# Patient Record
Sex: FEMALE | NOT HISPANIC OR LATINO | Employment: FULL TIME | URBAN - METROPOLITAN AREA
[De-identification: names, ages, dates, MRNs, and addresses within clinical notes are randomized per-mention and may not be internally consistent; named-entity substitution may affect disease eponyms.]

---

## 2022-01-12 ENCOUNTER — HOSPITAL ENCOUNTER (EMERGENCY)
Facility: HOSPITAL | Age: 37
Discharge: LEFT BEFORE DISCHARGE | End: 2022-01-12

## 2022-01-12 VITALS
BODY MASS INDEX: 19.99 KG/M2 | HEART RATE: 120 BPM | SYSTOLIC BLOOD PRESSURE: 141 MMHG | WEIGHT: 120 LBS | RESPIRATION RATE: 16 BRPM | HEIGHT: 65 IN | OXYGEN SATURATION: 100 % | DIASTOLIC BLOOD PRESSURE: 92 MMHG | TEMPERATURE: 98 F

## 2022-01-12 LAB
BASOPHILS # BLD AUTO: 0.04 10*3/MM3 (ref 0–0.2)
BASOPHILS NFR BLD AUTO: 0.3 % (ref 0–1.5)
DEPRECATED RDW RBC AUTO: 43.6 FL (ref 37–54)
EOSINOPHIL # BLD AUTO: 0.15 10*3/MM3 (ref 0–0.4)
EOSINOPHIL NFR BLD AUTO: 1.2 % (ref 0.3–6.2)
ERYTHROCYTE [DISTWIDTH] IN BLOOD BY AUTOMATED COUNT: 14.4 % (ref 12.3–15.4)
HCT VFR BLD AUTO: 35.2 % (ref 34–46.6)
HGB BLD-MCNC: 11.6 G/DL (ref 12–15.9)
HOLD SPECIMEN: NORMAL
HOLD SPECIMEN: NORMAL
IMM GRANULOCYTES # BLD AUTO: 0.05 10*3/MM3 (ref 0–0.05)
IMM GRANULOCYTES NFR BLD AUTO: 0.4 % (ref 0–0.5)
LYMPHOCYTES # BLD AUTO: 2.49 10*3/MM3 (ref 0.7–3.1)
LYMPHOCYTES NFR BLD AUTO: 19.3 % (ref 19.6–45.3)
MCH RBC QN AUTO: 27.5 PG (ref 26.6–33)
MCHC RBC AUTO-ENTMCNC: 33 G/DL (ref 31.5–35.7)
MCV RBC AUTO: 83.4 FL (ref 79–97)
MONOCYTES # BLD AUTO: 0.63 10*3/MM3 (ref 0.1–0.9)
MONOCYTES NFR BLD AUTO: 4.9 % (ref 5–12)
NEUTROPHILS NFR BLD AUTO: 73.9 % (ref 42.7–76)
NEUTROPHILS NFR BLD AUTO: 9.55 10*3/MM3 (ref 1.7–7)
NRBC BLD AUTO-RTO: 0 /100 WBC (ref 0–0.2)
PLATELET # BLD AUTO: 214 10*3/MM3 (ref 140–450)
PMV BLD AUTO: 11.1 FL (ref 6–12)
RBC # BLD AUTO: 4.22 10*6/MM3 (ref 3.77–5.28)
WBC NRBC COR # BLD: 12.91 10*3/MM3 (ref 3.4–10.8)
WHOLE BLOOD HOLD SPECIMEN: NORMAL
WHOLE BLOOD HOLD SPECIMEN: NORMAL

## 2022-01-12 PROCEDURE — 93005 ELECTROCARDIOGRAM TRACING: CPT

## 2022-01-12 PROCEDURE — 99211 OFF/OP EST MAY X REQ PHY/QHP: CPT

## 2022-01-12 PROCEDURE — 85025 COMPLETE CBC W/AUTO DIFF WBC: CPT

## 2022-01-12 RX ORDER — SODIUM CHLORIDE 0.9 % (FLUSH) 0.9 %
10 SYRINGE (ML) INJECTION AS NEEDED
Status: DISCONTINUED | OUTPATIENT
Start: 2022-01-12 | End: 2022-01-13 | Stop reason: HOSPADM

## 2022-01-13 ENCOUNTER — ANESTHESIA EVENT (OUTPATIENT)
Dept: PERIOP | Facility: HOSPITAL | Age: 37
End: 2022-01-13

## 2022-01-13 ENCOUNTER — ANESTHESIA (OUTPATIENT)
Dept: PERIOP | Facility: HOSPITAL | Age: 37
End: 2022-01-13

## 2022-01-13 ENCOUNTER — APPOINTMENT (OUTPATIENT)
Dept: ULTRASOUND IMAGING | Facility: HOSPITAL | Age: 37
End: 2022-01-13

## 2022-01-13 ENCOUNTER — HOSPITAL ENCOUNTER (OUTPATIENT)
Facility: HOSPITAL | Age: 37
Discharge: HOME | End: 2022-01-14
Attending: EMERGENCY MEDICINE | Admitting: OBSTETRICS & GYNECOLOGY

## 2022-01-13 DIAGNOSIS — O00.102 RUPTURED LEFT TUBAL ECTOPIC PREGNANCY CAUSING HEMOPERITONEUM: Primary | ICD-10-CM

## 2022-01-13 DIAGNOSIS — Z92.0 HISTORY OF USE OF CONTRACEPTIVE INTRAUTERINE DEVICE (IUD): ICD-10-CM

## 2022-01-13 DIAGNOSIS — R10.2 SUPRAPUBIC ABDOMINAL PAIN: ICD-10-CM

## 2022-01-13 DIAGNOSIS — K66.1 RUPTURED LEFT TUBAL ECTOPIC PREGNANCY CAUSING HEMOPERITONEUM: Primary | ICD-10-CM

## 2022-01-13 DIAGNOSIS — N93.9 VAGINAL BLEEDING: ICD-10-CM

## 2022-01-13 DIAGNOSIS — O00.90 ECTOPIC PREGNANCY: ICD-10-CM

## 2022-01-13 PROBLEM — D50.0 BLOOD LOSS ANEMIA: Status: ACTIVE | Noted: 2022-01-13

## 2022-01-13 LAB
ABO GROUP BLD: NORMAL
ABO GROUP BLD: NORMAL
ALBUMIN SERPL-MCNC: 4.5 G/DL (ref 3.5–5.2)
ALBUMIN/GLOB SERPL: 1.7 G/DL
ALP SERPL-CCNC: 77 U/L (ref 39–117)
ALT SERPL W P-5'-P-CCNC: 16 U/L (ref 1–33)
ANION GAP SERPL CALCULATED.3IONS-SCNC: 11 MMOL/L (ref 5–15)
AST SERPL-CCNC: 16 U/L (ref 1–32)
BACTERIA UR QL AUTO: ABNORMAL /HPF
BASOPHILS # BLD AUTO: 0.03 10*3/MM3 (ref 0–0.2)
BASOPHILS NFR BLD AUTO: 0.4 % (ref 0–1.5)
BILIRUB SERPL-MCNC: 0.7 MG/DL (ref 0–1.2)
BILIRUB UR QL STRIP: NEGATIVE
BLD GP AB SCN SERPL QL: NEGATIVE
BUN SERPL-MCNC: 9 MG/DL (ref 6–20)
BUN/CREAT SERPL: 15.5 (ref 7–25)
CALCIUM SPEC-SCNC: 9.3 MG/DL (ref 8.6–10.5)
CHLORIDE SERPL-SCNC: 105 MMOL/L (ref 98–107)
CLARITY UR: ABNORMAL
CO2 SERPL-SCNC: 23 MMOL/L (ref 22–29)
COLOR UR: YELLOW
CREAT SERPL-MCNC: 0.58 MG/DL (ref 0.57–1)
DEPRECATED RDW RBC AUTO: 43.8 FL (ref 37–54)
EOSINOPHIL # BLD AUTO: 0.1 10*3/MM3 (ref 0–0.4)
EOSINOPHIL NFR BLD AUTO: 1.3 % (ref 0.3–6.2)
ERYTHROCYTE [DISTWIDTH] IN BLOOD BY AUTOMATED COUNT: 14.2 % (ref 12.3–15.4)
GFR SERPL CREATININE-BSD FRML MDRD: 116 ML/MIN/1.73
GFR SERPL CREATININE-BSD FRML MDRD: 141 ML/MIN/1.73
GLOBULIN UR ELPH-MCNC: 2.7 GM/DL
GLUCOSE SERPL-MCNC: 92 MG/DL (ref 65–99)
GLUCOSE UR STRIP-MCNC: NEGATIVE MG/DL
HCG INTACT+B SERPL-ACNC: 2672 MIU/ML
HCT VFR BLD AUTO: 31.2 % (ref 34–46.6)
HGB BLD-MCNC: 10.2 G/DL (ref 12–15.9)
HGB UR QL STRIP.AUTO: ABNORMAL
HOLD SPECIMEN: NORMAL
HYALINE CASTS UR QL AUTO: ABNORMAL /LPF
IMM GRANULOCYTES # BLD AUTO: 0.01 10*3/MM3 (ref 0–0.05)
IMM GRANULOCYTES NFR BLD AUTO: 0.1 % (ref 0–0.5)
KETONES UR QL STRIP: ABNORMAL
LEUKOCYTE ESTERASE UR QL STRIP.AUTO: NEGATIVE
LIPASE SERPL-CCNC: 22 U/L (ref 13–60)
LYMPHOCYTES # BLD AUTO: 1.7 10*3/MM3 (ref 0.7–3.1)
LYMPHOCYTES NFR BLD AUTO: 22.1 % (ref 19.6–45.3)
MCH RBC QN AUTO: 27.5 PG (ref 26.6–33)
MCHC RBC AUTO-ENTMCNC: 32.7 G/DL (ref 31.5–35.7)
MCV RBC AUTO: 84.1 FL (ref 79–97)
MONOCYTES # BLD AUTO: 0.51 10*3/MM3 (ref 0.1–0.9)
MONOCYTES NFR BLD AUTO: 6.6 % (ref 5–12)
NEUTROPHILS NFR BLD AUTO: 5.35 10*3/MM3 (ref 1.7–7)
NEUTROPHILS NFR BLD AUTO: 69.5 % (ref 42.7–76)
NITRITE UR QL STRIP: NEGATIVE
NRBC BLD AUTO-RTO: 0 /100 WBC (ref 0–0.2)
PH UR STRIP.AUTO: 5.5 [PH] (ref 5–8)
PLATELET # BLD AUTO: 210 10*3/MM3 (ref 140–450)
PMV BLD AUTO: 11.1 FL (ref 6–12)
POTASSIUM SERPL-SCNC: 4.1 MMOL/L (ref 3.5–5.2)
PROT SERPL-MCNC: 7.2 G/DL (ref 6–8.5)
PROT UR QL STRIP: NEGATIVE
RBC # BLD AUTO: 3.71 10*6/MM3 (ref 3.77–5.28)
RBC # UR STRIP: ABNORMAL /HPF
REF LAB TEST METHOD: ABNORMAL
RH BLD: POSITIVE
RH BLD: POSITIVE
SARS-COV-2 RDRP RESP QL NAA+PROBE: NORMAL
SODIUM SERPL-SCNC: 139 MMOL/L (ref 136–145)
SP GR UR STRIP: 1.01 (ref 1–1.03)
SQUAMOUS #/AREA URNS HPF: ABNORMAL /HPF
T&S EXPIRATION DATE: NORMAL
UROBILINOGEN UR QL STRIP: ABNORMAL
WBC # UR STRIP: ABNORMAL /HPF
WBC NRBC COR # BLD: 7.7 10*3/MM3 (ref 3.4–10.8)
WHOLE BLOOD HOLD SPECIMEN: NORMAL
WHOLE BLOOD HOLD SPECIMEN: NORMAL

## 2022-01-13 PROCEDURE — 88305 TISSUE EXAM BY PATHOLOGIST: CPT | Performed by: OBSTETRICS & GYNECOLOGY

## 2022-01-13 PROCEDURE — 84702 CHORIONIC GONADOTROPIN TEST: CPT

## 2022-01-13 PROCEDURE — 86850 RBC ANTIBODY SCREEN: CPT | Performed by: OBSTETRICS & GYNECOLOGY

## 2022-01-13 PROCEDURE — 86900 BLOOD TYPING SEROLOGIC ABO: CPT | Performed by: PHYSICIAN ASSISTANT

## 2022-01-13 PROCEDURE — 86901 BLOOD TYPING SEROLOGIC RH(D): CPT | Performed by: OBSTETRICS & GYNECOLOGY

## 2022-01-13 PROCEDURE — 25010000002 NEOSTIGMINE 10 MG/10ML SOLUTION: Performed by: ANESTHESIOLOGY

## 2022-01-13 PROCEDURE — 25010000002 DEXAMETHASONE PER 1 MG: Performed by: ANESTHESIOLOGY

## 2022-01-13 PROCEDURE — 81001 URINALYSIS AUTO W/SCOPE: CPT

## 2022-01-13 PROCEDURE — 86900 BLOOD TYPING SEROLOGIC ABO: CPT | Performed by: OBSTETRICS & GYNECOLOGY

## 2022-01-13 PROCEDURE — 76817 TRANSVAGINAL US OBSTETRIC: CPT

## 2022-01-13 PROCEDURE — 80053 COMPREHEN METABOLIC PANEL: CPT

## 2022-01-13 PROCEDURE — 87635 SARS-COV-2 COVID-19 AMP PRB: CPT | Performed by: PHYSICIAN ASSISTANT

## 2022-01-13 PROCEDURE — 99284 EMERGENCY DEPT VISIT MOD MDM: CPT

## 2022-01-13 PROCEDURE — 86901 BLOOD TYPING SEROLOGIC RH(D): CPT | Performed by: PHYSICIAN ASSISTANT

## 2022-01-13 PROCEDURE — 25010000002 FENTANYL CITRATE (PF) 50 MCG/ML SOLUTION: Performed by: ANESTHESIOLOGY

## 2022-01-13 PROCEDURE — 83690 ASSAY OF LIPASE: CPT

## 2022-01-13 PROCEDURE — C9803 HOPD COVID-19 SPEC COLLECT: HCPCS

## 2022-01-13 PROCEDURE — 85025 COMPLETE CBC W/AUTO DIFF WBC: CPT

## 2022-01-13 PROCEDURE — 25010000002 PROPOFOL 10 MG/ML EMULSION: Performed by: ANESTHESIOLOGY

## 2022-01-13 PROCEDURE — 25010000002 ONDANSETRON PER 1 MG: Performed by: ANESTHESIOLOGY

## 2022-01-13 RX ORDER — ONDANSETRON 2 MG/ML
INJECTION INTRAMUSCULAR; INTRAVENOUS AS NEEDED
Status: DISCONTINUED | OUTPATIENT
Start: 2022-01-13 | End: 2022-01-13 | Stop reason: SURG

## 2022-01-13 RX ORDER — GLYCOPYRROLATE 0.2 MG/ML
INJECTION INTRAMUSCULAR; INTRAVENOUS AS NEEDED
Status: DISCONTINUED | OUTPATIENT
Start: 2022-01-13 | End: 2022-01-13 | Stop reason: SURG

## 2022-01-13 RX ORDER — BUPIVACAINE HYDROCHLORIDE AND EPINEPHRINE 5; 5 MG/ML; UG/ML
INJECTION, SOLUTION PERINEURAL AS NEEDED
Status: DISCONTINUED | OUTPATIENT
Start: 2022-01-13 | End: 2022-01-13 | Stop reason: HOSPADM

## 2022-01-13 RX ORDER — LIDOCAINE HYDROCHLORIDE 10 MG/ML
0.5 INJECTION, SOLUTION EPIDURAL; INFILTRATION; INTRACAUDAL; PERINEURAL ONCE AS NEEDED
Status: DISCONTINUED | OUTPATIENT
Start: 2022-01-13 | End: 2022-01-14

## 2022-01-13 RX ORDER — SODIUM CHLORIDE 0.9 % (FLUSH) 0.9 %
10 SYRINGE (ML) INJECTION EVERY 12 HOURS SCHEDULED
Status: DISCONTINUED | OUTPATIENT
Start: 2022-01-13 | End: 2022-01-14

## 2022-01-13 RX ORDER — SODIUM CHLORIDE 9 MG/ML
10 INJECTION INTRAVENOUS AS NEEDED
Status: DISCONTINUED | OUTPATIENT
Start: 2022-01-13 | End: 2022-01-14

## 2022-01-13 RX ORDER — LIDOCAINE HYDROCHLORIDE 20 MG/ML
INJECTION, SOLUTION INFILTRATION; PERINEURAL AS NEEDED
Status: DISCONTINUED | OUTPATIENT
Start: 2022-01-13 | End: 2022-01-13 | Stop reason: SURG

## 2022-01-13 RX ORDER — MEPERIDINE HYDROCHLORIDE 25 MG/ML
12.5 INJECTION INTRAMUSCULAR; INTRAVENOUS; SUBCUTANEOUS
Status: DISCONTINUED | OUTPATIENT
Start: 2022-01-13 | End: 2022-01-14

## 2022-01-13 RX ORDER — SODIUM CHLORIDE, SODIUM LACTATE, POTASSIUM CHLORIDE, CALCIUM CHLORIDE 600; 310; 30; 20 MG/100ML; MG/100ML; MG/100ML; MG/100ML
INJECTION, SOLUTION INTRAVENOUS CONTINUOUS PRN
Status: DISCONTINUED | OUTPATIENT
Start: 2022-01-13 | End: 2022-01-13 | Stop reason: SURG

## 2022-01-13 RX ORDER — NEOSTIGMINE METHYLSULFATE 1 MG/ML
INJECTION, SOLUTION INTRAVENOUS AS NEEDED
Status: DISCONTINUED | OUTPATIENT
Start: 2022-01-13 | End: 2022-01-13 | Stop reason: SURG

## 2022-01-13 RX ORDER — DROPERIDOL 2.5 MG/ML
0.62 INJECTION, SOLUTION INTRAMUSCULAR; INTRAVENOUS ONCE AS NEEDED
Status: DISCONTINUED | OUTPATIENT
Start: 2022-01-13 | End: 2022-01-14

## 2022-01-13 RX ORDER — FENTANYL CITRATE 50 UG/ML
INJECTION, SOLUTION INTRAMUSCULAR; INTRAVENOUS AS NEEDED
Status: DISCONTINUED | OUTPATIENT
Start: 2022-01-13 | End: 2022-01-13 | Stop reason: SURG

## 2022-01-13 RX ORDER — DEXAMETHASONE SODIUM PHOSPHATE 10 MG/ML
INJECTION INTRAMUSCULAR; INTRAVENOUS AS NEEDED
Status: DISCONTINUED | OUTPATIENT
Start: 2022-01-13 | End: 2022-01-13 | Stop reason: SURG

## 2022-01-13 RX ORDER — FENTANYL CITRATE 50 UG/ML
50 INJECTION, SOLUTION INTRAMUSCULAR; INTRAVENOUS
Status: DISCONTINUED | OUTPATIENT
Start: 2022-01-13 | End: 2022-01-14

## 2022-01-13 RX ORDER — FAMOTIDINE 10 MG/ML
20 INJECTION, SOLUTION INTRAVENOUS ONCE
Status: COMPLETED | OUTPATIENT
Start: 2022-01-13 | End: 2022-01-13

## 2022-01-13 RX ORDER — ONDANSETRON 2 MG/ML
4 INJECTION INTRAMUSCULAR; INTRAVENOUS ONCE AS NEEDED
Status: DISCONTINUED | OUTPATIENT
Start: 2022-01-13 | End: 2022-01-14

## 2022-01-13 RX ORDER — SODIUM CHLORIDE 0.9 % (FLUSH) 0.9 %
10 SYRINGE (ML) INJECTION AS NEEDED
Status: DISCONTINUED | OUTPATIENT
Start: 2022-01-13 | End: 2022-01-14

## 2022-01-13 RX ORDER — FAMOTIDINE 20 MG/1
20 TABLET, FILM COATED ORAL ONCE
Status: DISCONTINUED | OUTPATIENT
Start: 2022-01-13 | End: 2022-01-14

## 2022-01-13 RX ORDER — PROPOFOL 10 MG/ML
VIAL (ML) INTRAVENOUS AS NEEDED
Status: DISCONTINUED | OUTPATIENT
Start: 2022-01-13 | End: 2022-01-13 | Stop reason: SURG

## 2022-01-13 RX ORDER — MIDAZOLAM HYDROCHLORIDE 1 MG/ML
1 INJECTION INTRAMUSCULAR; INTRAVENOUS
Status: DISCONTINUED | OUTPATIENT
Start: 2022-01-13 | End: 2022-01-14

## 2022-01-13 RX ORDER — SODIUM CHLORIDE 9 MG/ML
INJECTION, SOLUTION INTRAVENOUS AS NEEDED
Status: DISCONTINUED | OUTPATIENT
Start: 2022-01-13 | End: 2022-01-13 | Stop reason: HOSPADM

## 2022-01-13 RX ORDER — HYDROMORPHONE HYDROCHLORIDE 1 MG/ML
0.5 INJECTION, SOLUTION INTRAMUSCULAR; INTRAVENOUS; SUBCUTANEOUS
Status: DISCONTINUED | OUTPATIENT
Start: 2022-01-13 | End: 2022-01-14

## 2022-01-13 RX ORDER — SODIUM CHLORIDE, SODIUM LACTATE, POTASSIUM CHLORIDE, CALCIUM CHLORIDE 600; 310; 30; 20 MG/100ML; MG/100ML; MG/100ML; MG/100ML
9 INJECTION, SOLUTION INTRAVENOUS CONTINUOUS
Status: DISCONTINUED | OUTPATIENT
Start: 2022-01-13 | End: 2022-01-14

## 2022-01-13 RX ORDER — ROCURONIUM BROMIDE 10 MG/ML
INJECTION, SOLUTION INTRAVENOUS AS NEEDED
Status: DISCONTINUED | OUTPATIENT
Start: 2022-01-13 | End: 2022-01-13 | Stop reason: SURG

## 2022-01-13 RX ADMIN — SODIUM CHLORIDE 1000 ML: 9 INJECTION, SOLUTION INTRAVENOUS at 20:58

## 2022-01-13 RX ADMIN — DEXAMETHASONE SODIUM PHOSPHATE 8 MG: 10 INJECTION INTRAMUSCULAR; INTRAVENOUS at 22:23

## 2022-01-13 RX ADMIN — FAMOTIDINE 20 MG: 10 INJECTION INTRAVENOUS at 22:03

## 2022-01-13 RX ADMIN — GLYCOPYRROLATE 0.4 MG: 0.2 INJECTION INTRAMUSCULAR; INTRAVENOUS at 23:31

## 2022-01-13 RX ADMIN — ROCURONIUM BROMIDE 35 MG: 10 INJECTION INTRAVENOUS at 22:14

## 2022-01-13 RX ADMIN — ONDANSETRON 4 MG: 2 INJECTION INTRAMUSCULAR; INTRAVENOUS at 23:31

## 2022-01-13 RX ADMIN — LIDOCAINE HYDROCHLORIDE 50 MG: 20 INJECTION, SOLUTION INFILTRATION; PERINEURAL at 22:13

## 2022-01-13 RX ADMIN — FENTANYL CITRATE 100 MCG: 50 INJECTION, SOLUTION INTRAMUSCULAR; INTRAVENOUS at 22:13

## 2022-01-13 RX ADMIN — PROPOFOL 100 MG: 10 INJECTION, EMULSION INTRAVENOUS at 22:13

## 2022-01-13 RX ADMIN — NEOSTIGMINE METHYLSULFATE 2 MG: 0.5 INJECTION INTRAVENOUS at 23:31

## 2022-01-13 RX ADMIN — SODIUM CHLORIDE, POTASSIUM CHLORIDE, SODIUM LACTATE AND CALCIUM CHLORIDE: 600; 310; 30; 20 INJECTION, SOLUTION INTRAVENOUS at 22:13

## 2022-01-13 RX ADMIN — SODIUM CHLORIDE, POTASSIUM CHLORIDE, SODIUM LACTATE AND CALCIUM CHLORIDE 9 ML/HR: 600; 310; 30; 20 INJECTION, SOLUTION INTRAVENOUS at 22:03

## 2022-01-14 ENCOUNTER — READMISSION MANAGEMENT (OUTPATIENT)
Dept: CALL CENTER | Facility: HOSPITAL | Age: 37
End: 2022-01-14

## 2022-01-14 VITALS
HEART RATE: 75 BPM | SYSTOLIC BLOOD PRESSURE: 106 MMHG | BODY MASS INDEX: 21.79 KG/M2 | WEIGHT: 130.8 LBS | HEIGHT: 65 IN | DIASTOLIC BLOOD PRESSURE: 79 MMHG | OXYGEN SATURATION: 100 % | RESPIRATION RATE: 16 BRPM | TEMPERATURE: 98.5 F

## 2022-01-14 LAB
DEPRECATED RDW RBC AUTO: 43.7 FL (ref 37–54)
ERYTHROCYTE [DISTWIDTH] IN BLOOD BY AUTOMATED COUNT: 14.1 % (ref 12.3–15.4)
HCT VFR BLD AUTO: 31.6 % (ref 34–46.6)
HGB BLD-MCNC: 10.1 G/DL (ref 12–15.9)
MCH RBC QN AUTO: 27.2 PG (ref 26.6–33)
MCHC RBC AUTO-ENTMCNC: 32 G/DL (ref 31.5–35.7)
MCV RBC AUTO: 85.2 FL (ref 79–97)
PLATELET # BLD AUTO: 187 10*3/MM3 (ref 140–450)
PMV BLD AUTO: 11.4 FL (ref 6–12)
POTASSIUM SERPL-SCNC: 3.3 MMOL/L (ref 3.5–5.2)
RBC # BLD AUTO: 3.71 10*6/MM3 (ref 3.77–5.28)
WBC NRBC COR # BLD: 9.74 10*3/MM3 (ref 3.4–10.8)

## 2022-01-14 PROCEDURE — 25010000002 KETOROLAC TROMETHAMINE PER 15 MG: Performed by: OBSTETRICS & GYNECOLOGY

## 2022-01-14 PROCEDURE — 85027 COMPLETE CBC AUTOMATED: CPT | Performed by: OBSTETRICS & GYNECOLOGY

## 2022-01-14 PROCEDURE — 84132 ASSAY OF SERUM POTASSIUM: CPT | Performed by: ANESTHESIOLOGY

## 2022-01-14 PROCEDURE — G0378 HOSPITAL OBSERVATION PER HR: HCPCS

## 2022-01-14 RX ORDER — ONDANSETRON 4 MG/1
4 TABLET, FILM COATED ORAL EVERY 6 HOURS PRN
Status: DISCONTINUED | OUTPATIENT
Start: 2022-01-14 | End: 2022-01-14 | Stop reason: HOSPADM

## 2022-01-14 RX ORDER — IBUPROFEN 600 MG/1
600 TABLET ORAL EVERY 6 HOURS PRN
Qty: 30 TABLET | Refills: 0 | Status: SHIPPED | OUTPATIENT
Start: 2022-01-14 | End: 2022-01-27

## 2022-01-14 RX ORDER — KETOROLAC TROMETHAMINE 15 MG/ML
15 INJECTION, SOLUTION INTRAMUSCULAR; INTRAVENOUS EVERY 6 HOURS
Status: DISCONTINUED | OUTPATIENT
Start: 2022-01-14 | End: 2022-01-14 | Stop reason: SDUPTHER

## 2022-01-14 RX ORDER — SIMETHICONE 80 MG
80 TABLET,CHEWABLE ORAL 4 TIMES DAILY PRN
Status: DISCONTINUED | OUTPATIENT
Start: 2022-01-14 | End: 2022-01-14 | Stop reason: HOSPADM

## 2022-01-14 RX ORDER — HYDROCODONE BITARTRATE AND ACETAMINOPHEN 10; 325 MG/1; MG/1
1 TABLET ORAL EVERY 4 HOURS PRN
Status: DISCONTINUED | OUTPATIENT
Start: 2022-01-14 | End: 2022-01-14 | Stop reason: HOSPADM

## 2022-01-14 RX ORDER — POTASSIUM CHLORIDE 750 MG/1
40 CAPSULE, EXTENDED RELEASE ORAL ONCE
Status: COMPLETED | OUTPATIENT
Start: 2022-01-14 | End: 2022-01-14

## 2022-01-14 RX ORDER — DOCUSATE SODIUM 100 MG/1
100 CAPSULE, LIQUID FILLED ORAL 2 TIMES DAILY
Status: DISCONTINUED | OUTPATIENT
Start: 2022-01-14 | End: 2022-01-14 | Stop reason: HOSPADM

## 2022-01-14 RX ORDER — NALOXONE HCL 0.4 MG/ML
0.1 VIAL (ML) INJECTION
Status: DISCONTINUED | OUTPATIENT
Start: 2022-01-14 | End: 2022-01-14 | Stop reason: HOSPADM

## 2022-01-14 RX ORDER — HYDROCODONE BITARTRATE AND ACETAMINOPHEN 5; 325 MG/1; MG/1
1 TABLET ORAL EVERY 4 HOURS PRN
Qty: 10 TABLET | Refills: 0 | Status: SHIPPED | OUTPATIENT
Start: 2022-01-14 | End: 2022-01-24

## 2022-01-14 RX ORDER — METOCLOPRAMIDE HYDROCHLORIDE 5 MG/ML
10 INJECTION INTRAMUSCULAR; INTRAVENOUS EVERY 6 HOURS PRN
Status: DISCONTINUED | OUTPATIENT
Start: 2022-01-14 | End: 2022-01-14 | Stop reason: HOSPADM

## 2022-01-14 RX ORDER — SODIUM CHLORIDE, SODIUM LACTATE, POTASSIUM CHLORIDE, CALCIUM CHLORIDE 600; 310; 30; 20 MG/100ML; MG/100ML; MG/100ML; MG/100ML
100 INJECTION, SOLUTION INTRAVENOUS CONTINUOUS
Status: DISCONTINUED | OUTPATIENT
Start: 2022-01-14 | End: 2022-01-14 | Stop reason: HOSPADM

## 2022-01-14 RX ORDER — KETOROLAC TROMETHAMINE 15 MG/ML
15 INJECTION, SOLUTION INTRAMUSCULAR; INTRAVENOUS EVERY 6 HOURS
Status: COMPLETED | OUTPATIENT
Start: 2022-01-14 | End: 2022-01-14

## 2022-01-14 RX ORDER — FERROUS SULFATE 325(65) MG
325 TABLET ORAL
Qty: 30 TABLET | Refills: 0 | Status: SHIPPED | OUTPATIENT
Start: 2022-01-14 | End: 2023-02-23

## 2022-01-14 RX ORDER — HYDROCODONE BITARTRATE AND ACETAMINOPHEN 5; 325 MG/1; MG/1
1 TABLET ORAL EVERY 4 HOURS PRN
Status: DISCONTINUED | OUTPATIENT
Start: 2022-01-14 | End: 2022-01-14 | Stop reason: HOSPADM

## 2022-01-14 RX ORDER — HYDROMORPHONE HCL 110MG/55ML
0.5 PATIENT CONTROLLED ANALGESIA SYRINGE INTRAVENOUS
Status: DISCONTINUED | OUTPATIENT
Start: 2022-01-14 | End: 2022-01-14 | Stop reason: HOSPADM

## 2022-01-14 RX ORDER — POLYETHYLENE GLYCOL 3350 17 G/17G
17 POWDER, FOR SOLUTION ORAL DAILY
Status: DISCONTINUED | OUTPATIENT
Start: 2022-01-14 | End: 2022-01-14 | Stop reason: HOSPADM

## 2022-01-14 RX ORDER — PSEUDOEPHEDRINE HCL 30 MG
100 TABLET ORAL 2 TIMES DAILY
Qty: 60 CAPSULE | Refills: 0 | Status: SHIPPED | OUTPATIENT
Start: 2022-01-14 | End: 2022-01-27

## 2022-01-14 RX ORDER — ONDANSETRON 2 MG/ML
4 INJECTION INTRAMUSCULAR; INTRAVENOUS EVERY 6 HOURS PRN
Status: DISCONTINUED | OUTPATIENT
Start: 2022-01-14 | End: 2022-01-14 | Stop reason: HOSPADM

## 2022-01-14 RX ADMIN — KETOROLAC TROMETHAMINE 15 MG: 15 INJECTION, SOLUTION INTRAMUSCULAR; INTRAVENOUS at 05:56

## 2022-01-14 RX ADMIN — DOCUSATE SODIUM 100 MG: 100 CAPSULE, LIQUID FILLED ORAL at 01:11

## 2022-01-14 RX ADMIN — POLYETHYLENE GLYCOL 3350 17 G: 17 POWDER, FOR SOLUTION ORAL at 09:19

## 2022-01-14 RX ADMIN — POTASSIUM CHLORIDE 40 MEQ: 750 CAPSULE, EXTENDED RELEASE ORAL at 09:18

## 2022-01-14 RX ADMIN — HYDROCODONE BITARTRATE AND ACETAMINOPHEN 1 TABLET: 10; 325 TABLET ORAL at 03:01

## 2022-01-14 RX ADMIN — SODIUM CHLORIDE, POTASSIUM CHLORIDE, SODIUM LACTATE AND CALCIUM CHLORIDE 100 ML/HR: 600; 310; 30; 20 INJECTION, SOLUTION INTRAVENOUS at 00:28

## 2022-01-14 RX ADMIN — KETOROLAC TROMETHAMINE 15 MG: 15 INJECTION, SOLUTION INTRAMUSCULAR; INTRAVENOUS at 01:11

## 2022-01-14 NOTE — ANESTHESIA PREPROCEDURE EVALUATION
Anesthesia Evaluation     Patient summary reviewed and Nursing notes reviewed   NPO Solid Status: > 8 hours  NPO Liquid Status: > 8 hours           Airway   Mallampati: I  TM distance: >3 FB  Neck ROM: full  No difficulty expected  Dental      Pulmonary     breath sounds clear to auscultation  Cardiovascular     Rhythm: regular  Rate: normal        Neuro/Psych  GI/Hepatic/Renal/Endo      Musculoskeletal     Abdominal    Substance History      OB/GYN    (+) Pregnant,         Other        ROS/Med Hx Other: Ectopic pregnancy                  Anesthesia Plan    ASA 2 - emergent     general   (TAP's if open)  intravenous induction     Anesthetic plan, all risks, benefits, and alternatives have been provided, discussed and informed consent has been obtained with: patient.    Plan discussed with CRNA.

## 2022-01-14 NOTE — OP NOTE
Left salpingoophorectomy Procedure Note      Pre-operative Diagnosis: Ruptured Ectopic pregnancy    Post-operative Diagnosis: Ruptured ectopic pregnancy    Operation: Laparoscopic left salpingoophorectomy    Surgeon: Mone Rivera MD     Assistants: Tech    Anesthesia: General endotracheal anesthesia    Findings: rupture of left ovarian ectopic, 200cc of hemoperitoneum. Normal uterus, right ovary, right fallopian tube    Estimated Blood Loss: 200 mL of hemoperitoneum, minimal surgical blood loss    Specimens: left tube and ovary    Complications:  None    Disposition: PACU - hemodynamically stable.      Procedure Details    The patient was seen in the Holding Room. The risks, benefits, complications, treatment options, and expected outcomes were discussed with the patient. The patient concurred with the proposed plan, giving informed consent. The patient was identified as Kimberly Erazo and the procedure verified as Laparoscopic Salpingectomy. A Time Out was held and the above information confirmed.    After induction of anesthesia, the patient was draped and prepped in the usual sterile manner. The uterus was  fit with an acorn manipulator.    The operators gloves were changed.  The subumbilical area was injected with a dilute marcaine solution.  An 5 mm infraumbilical incision was made. Intraperitoneal access was gained with the optical noncutting trocar under direct visualization. CO2 was used to insufflate the abdominopelvic cavity to a pressure of approximately 15 mm Hg. She was placed in maximum Trendelenburg. One additional 5 mm port was placed in the RLQ and one additional 12 mm port site was placed in the LLQ    The upper abdomen was normal.  The pelvis was filled with blood.  This was suctioned.  The pelvic anatomy was inspected.  The left fallopian tube was edematous.  The left ovary had a 2cm area of rupture that was actively bleeding.  The left IP was identified superior to the ureter.  The  harmonic scalpel was used to fulgurate and dissect the IP.  The dissection was carried through the uteroovarian ligament and parametrium to the cornual region, freeing the specimen.         The tube and ovary were then placed into an endo pouch and removed through the 12 mm port site.  The abdominopelvic cavity was irrigated and suctioned.  There was good hemostasis at low pressure.  The 12 mm trocar was closed with 0 vicryl with the Abran-  Zuniga closure system.  The CO2 gas was allowed to escape as completely as possible.   The skin incisions were closed with 4-0 monocryl and reinforced with skin glue. The counts were correct x 2. Patient was awakened and taken to recover in stable condition.       Mone Rivera MD  01/13/22  23:51 EST

## 2022-01-14 NOTE — H&P
[facility] History and Physical    Chief Complaint   Patient presents with   • Abdominal Pain       Subjective     Patient is a 36 y.o. female , who presents to the ER with 2 days of pelvic pain.  It got significantly worse this evening. Pain is worse on L side.  She reports a normal period 21.  She has had a mirena IUD in place x 4 yrs.  She has continued to have normal menses since placement.  She has a 4yo and 10yo both born via .      The following portions of the patients history were reviewed and updated as appropriate: current medications, allergies, past medical history, past surgical history, past family history, past social history and problem list .       Prenatal Information:   Maternal Prenatal Labs  Blood Type ABO Type   Date Value Ref Range Status   2022 O  Final      Rh Status RH type   Date Value Ref Range Status   2022 Positive  Final                     Past OB History:       OB History   No obstetric history on file.       Past Medical History: History reviewed. No pertinent past medical history.   Past Surgical History Past Surgical History:   Procedure Laterality Date   • INTRAUTERINE DEVICE INSERTION        Family History: History reviewed. No pertinent family history.   Social History:  reports that she has never smoked. She has never used smokeless tobacco.   reports current alcohol use.   reports no history of drug use.        General ROS: Pertinent items are noted in HPI, all other systems reviewed and negative    Objective       Vital Signs Range for the last 24 hours  Temperature: Temp:  [98.1 °F (36.7 °C)-98.6 °F (37 °C)] 98.1 °F (36.7 °C)   Temp Source: Temp src: Temporal   BP: BP: (117-129)/(76-90) 129/76   Pulse: Heart Rate:  [] 91   Respirations: Resp:  [18-20] 20   SPO2: SpO2:  [100 %] 100 %   O2 Amount (l/min):     O2 Devices Device (Oxygen Therapy): room air   Weight: Weight:  [54.4 kg (120 lb)] 54.4 kg (120 lb)               Physical  Examination: General appearance - alert, well appearing, and in no distress  Mental status - alert, oriented to person, place, and time  Abdomen - soft, tender to palpation diffusely in lower abdomen  Extremities - peripheral pulses normal, no pedal edema, no clubbing or cyanosis  Skin - normal coloration and turgor, no rashes, no suspicious skin lesions noted  Chest- non labored breathing      Laboratory Results:   Results from last 7 days   Lab Units 01/13/22  1652   WBC 10*3/mm3 7.70   HEMOGLOBIN g/dL 10.2*   HEMATOCRIT % 31.2*   PLATELETS 10*3/mm3 210     Results from last 7 days   Lab Units 01/13/22  2058   ABO TYPING  O   RH TYPING  Positive     TVUS 1/13/22    IMPRESSION:  1.  Findings highly concerning for left tubal ectopic pregnancy.  2.  IUD normally positioned within otherwise normal-appearing endometrial cavity and uterus. No fluid within the endometrial cavity.  3.  Moderate volume free pelvic fluid that appears hemorrhagic.  4.  Suspected hematoma adjacent to the left ovary within which a small cystlike structure is seen. While ectopic gestation is not directly visualized, the findings are highly concerning for left tubal ectopic pregnancy.      Assessment/Plan       Ruptured left tubal ectopic pregnancy causing hemoperitoneum    Blood loss anemia        Assessment:  1.  Ruptured left ectopic pregnancy  2.  Hemoperitoneum with resultant anemia    Plan:  1. Proceed to OR for emergent laparoscopic left salpingectomy.  Discussed risk of needing to convert to laparotomy if unable to visualize anatomy due to bleeding.  Surgical risks reviewed.  Will order T&S due to anemia and hemoperitoneum visualized on US.  2. Plan of care has been reviewed with patient   3.  Risks, benefits of treatment plan have been discussed.  4.  All questions have been answered.        Mone Rivera MD  1/13/2022  22:03 EST

## 2022-01-14 NOTE — ANESTHESIA POSTPROCEDURE EVALUATION
Patient: Kimberly Erazo    Procedure Summary     Date: 01/13/22 Room / Location:  [facility] /  [facility]    Anesthesia Start: 2207 Anesthesia Stop: 2352    Procedure: DIAGNOSTIC LAPAROSCOPY, LEFT SALPINGO-OOPHORECTOMY (N/A Abdomen) Diagnosis:     Surgeons: Mone Rivera MD Provider: Carlos Alberto Tan MD    Anesthesia Type: general ASA Status: 2 - Emergent          Anesthesia Type: general    Vitals  Vitals Value Taken Time   /109 01/13/22 2349   Temp 97.4 °F (36.3 °C) 01/13/22 2348   Pulse 104 01/13/22 2348   Resp 20 01/13/22 2348   SpO2 100 % 01/13/22 2351   Vitals shown include unvalidated device data.        Post Anesthesia Care and Evaluation    Patient location during evaluation: PACU  Patient participation: complete - patient participated  Level of consciousness: awake and alert  Pain score: 1  Pain management: adequate  Airway patency: patent  Anesthetic complications: No anesthetic complications  PONV Status: none  Cardiovascular status: hemodynamically stable and acceptable  Respiratory status: nonlabored ventilation, acceptable and nasal cannula  Hydration status: acceptable

## 2022-01-14 NOTE — ED PROVIDER NOTES
Subjective   This is a 36-year-old female that presents the ER with progressive worsening of left suprapubic abdominal pain and irregular, heavy vaginal bleeding that started 2 days ago.  Patient has had IUD for the last 4 years.  She says that usually menses last about 4 days and patient has no significant abdominal cramping.  Patient says that she had normal menses around 2 weeks ago and had vaginal bleeding only for 2 days.  Patient started having heavy vaginal bleeding 7 days ago and started having increased bleeding with severe left suprapubic cramping 2 days ago.  She came to our ER but says that the wait was several hours and she has small children at home and needed to get home to them.  With persistent pain today and persistent vaginal bleeding, patient saw PCP at the [facility].  She had a positive urine pregnancy test and they referred her immediately to the ER for evaluation of ectopic pregnancy.  Patient took Tylenol for the cramping without much improvement in symptoms.  Patient denies any other previous abdominal surgeries.  Patient just moved here from [place] approximately 1 year ago and has not established with a gynecologist.  She works from home.  Past medical history is negative.  Patient takes no routine daily medications.      History provided by:  Patient  Abdominal Pain  Pain location:  Suprapubic (Left suprapubic)  Pain quality: cramping and sharp    Pain radiates to:  Does not radiate  Onset quality:  Sudden  Duration:  2 days  Timing:  Constant  Progression:  Unchanged  Chronicity:  New  Context: not previous surgeries (No previous abdominal surgeries.)    Context comment:  Pt has IUD x 4 yrs.  She had normal menses 10 days ago at the normal time.  Lasted 2 days.  Started having heavy vag bleeding 7 days ago with severe left suprapubic abd pain x 2 days.  Urine pregnancy (+). Sent for eval of ectopic  Relieved by:  Nothing  Worsened by:  Nothing  Ineffective treatments:   Acetaminophen  Associated symptoms: anorexia and vaginal bleeding    Associated symptoms: no chest pain, no chills, no constipation (Normal BM this morning.), no diarrhea, no dysuria, no fever, no hematochezia, no hematuria, no nausea, no shortness of breath and no vomiting    Risk factors comment:  History of IUD x 4 years.   Miscarriage 0.  Saw PCP at  earlier today and advised to come to ER immediately to r/o ectopic.      Review of Systems   Constitutional: Negative.  Negative for activity change, appetite change, chills and fever.   HENT: Negative.    Respiratory: Negative.  Negative for shortness of breath.    Cardiovascular: Negative.  Negative for chest pain, palpitations and leg swelling.   Gastrointestinal: Positive for abdominal pain (left suprapubic abdominal pain) and anorexia. Negative for constipation (Normal BM this morning.), diarrhea, hematochezia, nausea and vomiting.   Genitourinary: Positive for pelvic pain and vaginal bleeding. Negative for dysuria, flank pain, frequency and hematuria.        LMP: heavy bleeding x 7 days after normal menses 10 days ago.  IUD in place.  Urine pregnancy positive at  earlier today.  Sent for eval of ectopic pregnancy.   Miscarriage 0 Ectopic 0.   Musculoskeletal: Negative.  Negative for back pain.   All other systems reviewed and are negative.      History reviewed. No pertinent past medical history.    No Known Allergies    Past Surgical History:   Procedure Laterality Date   • INTRAUTERINE DEVICE INSERTION         History reviewed. No pertinent family history.    Social History     Socioeconomic History   • Marital status:    Tobacco Use   • Smoking status: Never Smoker   • Smokeless tobacco: Never Used   Substance and Sexual Activity   • Alcohol use: Yes     Comment: OCCASIONALLY   • Drug use: Never   • Sexual activity: Defer           Objective   Physical Exam  Vitals and nursing note reviewed.   Constitutional:       Appearance: Normal  appearance.      Comments: Patient appears uncomfortable secondary to abdominal pain.   HENT:      Head: Normocephalic and atraumatic.      Right Ear: Tympanic membrane normal.      Left Ear: Tympanic membrane normal.      Nose: Nose normal.      Mouth/Throat:      Mouth: Mucous membranes are moist.      Pharynx: Oropharynx is clear.   Eyes:      Extraocular Movements: Extraocular movements intact.      Conjunctiva/sclera: Conjunctivae normal.      Pupils: Pupils are equal, round, and reactive to light.   Cardiovascular:      Rate and Rhythm: Normal rate and regular rhythm.  No extrasystoles are present.     Pulses: Normal pulses.      Heart sounds: Normal heart sounds.      Comments: Regular rate and rhythm.  No ectopy.  Pulmonary:      Effort: Pulmonary effort is normal.      Breath sounds: Normal breath sounds.      Comments: Lungs are clear to auscultation bilaterally.  Abdominal:      General: Bowel sounds are normal. There is no distension.      Palpations: Abdomen is soft.      Tenderness: There is abdominal tenderness in the suprapubic area. There is guarding. There is no right CVA tenderness, left CVA tenderness or rebound.          Comments: Abdomen soft without distention.  Active bowel sounds in all 4 quadrants.  Moderate tenderness to left suprapubic region.  Involuntary guarding.  No flank or CVA tenderness.   Musculoskeletal:         General: Normal range of motion.      Cervical back: Normal range of motion and neck supple.      Right lower leg: No edema.      Left lower leg: No edema.   Skin:     General: Skin is warm and dry.   Neurological:      General: No focal deficit present.      Mental Status: She is alert.      Cranial Nerves: Cranial nerves are intact.      Sensory: Sensation is intact.      Motor: Motor function is intact.      Coordination: Coordination is intact.   Psychiatric:         Mood and Affect: Mood is anxious.         Speech: Speech normal.         Behavior: Behavior normal.  Behavior is cooperative.         Thought Content: Thought content normal.         Cognition and Memory: Cognition normal.      Comments: Anxious and uncomfortable secondary to abdominal pain.         Critical Care  Performed by: Perri Keys PA-C  Authorized by: Yao Garcia DO     Critical care provider statement:     Critical care time (minutes):  35    Critical care time was exclusive of:  Separately billable procedures and treating other patients    Critical care was necessary to treat or prevent imminent or life-threatening deterioration of the following conditions: Ruptured left tubal ectopic pregnancy with hemoperitoneum. History of IUD.    Critical care was time spent personally by me on the following activities:  Blood draw for specimens, development of treatment plan with patient or surrogate, discussions with consultants, examination of patient, obtaining history from patient or surrogate, review of old charts, re-evaluation of patient's condition, pulse oximetry, ordering and review of radiographic studies, ordering and review of laboratory studies and ordering and performing treatments and interventions               ED Course  ED Course as of 01/14/22 0433   Thu Jan 13, 2022   2119 Radiologist contacted me with critical results of ruptured left ectopic in the fallopian tube.  Radiologist reported that there was a large volume of free pelvic fluid, heterogeneous, consistent with probable blood adjacent to the left ovary.  Looks like there is a hematoma with a 2.3 cm cystic structure concerning for left tubal ectopic.  This is separate from the ovary.  Paged on-call OB/GYN, Dr. Mone Rivera to discuss the case.  She recommended rapid COVID testing and consent for laparoscopic left salpingectomy with possible conversion to open if necessary.  H&H is 10 and 31.  Quant hCG is 2672.  Urinalysis reveals 31-50 red blood cells, large 3+ blood but no other acute infectious process.  Patient is  hemodynamically stable.  I will go update her on all results and need for surgery. [FC]   Fri Jan 14, 2022 0419 Patient's COVID testing was negative.  Rh factor is positive.  I updated patient on results and need for surgery.  She verbalized understanding and contacted her  via telephone to let him know with the plan once.  Patient was anxious but nursing staff and myself reassured her.  Patient signed consent for surgery.  Patient was taken to the OR in stable condition. [FC]      ED Course User Index  [FC] Perri Keys PA-C           Recent Results (from the past 24 hour(s))   Comprehensive Metabolic Panel    Collection Time: 01/13/22  4:52 PM    Specimen: Blood   Result Value Ref Range    Glucose 92 65 - 99 mg/dL    BUN 9 6 - 20 mg/dL    Creatinine 0.58 0.57 - 1.00 mg/dL    Sodium 139 136 - 145 mmol/L    Potassium 4.1 3.5 - 5.2 mmol/L    Chloride 105 98 - 107 mmol/L    CO2 23.0 22.0 - 29.0 mmol/L    Calcium 9.3 8.6 - 10.5 mg/dL    Total Protein 7.2 6.0 - 8.5 g/dL    Albumin 4.50 3.50 - 5.20 g/dL    ALT (SGPT) 16 1 - 33 U/L    AST (SGOT) 16 1 - 32 U/L    Alkaline Phosphatase 77 39 - 117 U/L    Total Bilirubin 0.7 0.0 - 1.2 mg/dL    eGFR Non African Amer 116 >60 mL/min/1.73    eGFR  African Amer 141 >60 mL/min/1.73    Globulin 2.7 gm/dL    A/G Ratio 1.7 g/dL    BUN/Creatinine Ratio 15.5 7.0 - 25.0    Anion Gap 11.0 5.0 - 15.0 mmol/L   Lipase    Collection Time: 01/13/22  4:52 PM    Specimen: Blood   Result Value Ref Range    Lipase 22 13 - 60 U/L   Green Top (Gel)    Collection Time: 01/13/22  4:52 PM   Result Value Ref Range    Extra Tube Hold for add-ons.    Lavender Top    Collection Time: 01/13/22  4:52 PM   Result Value Ref Range    Extra Tube hold for add-on    Gold Top - SST    Collection Time: 01/13/22  4:52 PM   Result Value Ref Range    Extra Tube Hold for add-ons.    Gray Top    Collection Time: 01/13/22  4:52 PM   Result Value Ref Range    Extra Tube Hold for add-ons.    Light Blue Top     Collection Time: 01/13/22  4:52 PM   Result Value Ref Range    Extra Tube hold for add-on    CBC Auto Differential    Collection Time: 01/13/22  4:52 PM    Specimen: Blood   Result Value Ref Range    WBC 7.70 3.40 - 10.80 10*3/mm3    RBC 3.71 (L) 3.77 - 5.28 10*6/mm3    Hemoglobin 10.2 (L) 12.0 - 15.9 g/dL    Hematocrit 31.2 (L) 34.0 - 46.6 %    MCV 84.1 79.0 - 97.0 fL    MCH 27.5 26.6 - 33.0 pg    MCHC 32.7 31.5 - 35.7 g/dL    RDW 14.2 12.3 - 15.4 %    RDW-SD 43.8 37.0 - 54.0 fl    MPV 11.1 6.0 - 12.0 fL    Platelets 210 140 - 450 10*3/mm3    Neutrophil % 69.5 42.7 - 76.0 %    Lymphocyte % 22.1 19.6 - 45.3 %    Monocyte % 6.6 5.0 - 12.0 %    Eosinophil % 1.3 0.3 - 6.2 %    Basophil % 0.4 0.0 - 1.5 %    Immature Grans % 0.1 0.0 - 0.5 %    Neutrophils, Absolute 5.35 1.70 - 7.00 10*3/mm3    Lymphocytes, Absolute 1.70 0.70 - 3.10 10*3/mm3    Monocytes, Absolute 0.51 0.10 - 0.90 10*3/mm3    Eosinophils, Absolute 0.10 0.00 - 0.40 10*3/mm3    Basophils, Absolute 0.03 0.00 - 0.20 10*3/mm3    Immature Grans, Absolute 0.01 0.00 - 0.05 10*3/mm3    nRBC 0.0 0.0 - 0.2 /100 WBC   hCG, Quantitative, Pregnancy    Collection Time: 01/13/22  4:52 PM    Specimen: Blood   Result Value Ref Range    HCG Quantitative 2,672.00 mIU/mL   Urinalysis With Microscopic If Indicated (No Culture) - Urine, Clean Catch    Collection Time: 01/13/22  6:48 PM    Specimen: Urine, Clean Catch   Result Value Ref Range    Color, UA Yellow Yellow, Straw    Appearance, UA Cloudy (A) Clear    pH, UA 5.5 5.0 - 8.0    Specific Gravity, UA 1.013 1.001 - 1.030    Glucose, UA Negative Negative    Ketones, UA 15 mg/dL (1+) (A) Negative    Bilirubin, UA Negative Negative    Blood, UA Large (3+) (A) Negative    Protein, UA Negative Negative    Leuk Esterase, UA Negative Negative    Nitrite, UA Negative Negative    Urobilinogen, UA 0.2 E.U./dL 0.2 - 1.0 E.U./dL   Urinalysis, Microscopic Only - Urine, Clean Catch    Collection Time: 01/13/22  6:48 PM    Specimen:  Urine, Clean Catch   Result Value Ref Range    RBC, UA 31-50 (A) None Seen, 0-2 /HPF    WBC, UA 0-2 None Seen, 0-2 /HPF    Bacteria, UA None Seen None Seen, Trace /HPF    Squamous Epithelial Cells, UA 0-2 None Seen, 0-2 /HPF    Hyaline Casts, UA 0-6 0 - 6 /LPF    Methodology Automated Microscopy    ABO / Rh    Collection Time: 01/13/22  8:58 PM    Specimen: Blood   Result Value Ref Range    ABO Type O     RH type Positive    COVID-19, [facility] IN-HOUSE,NASAL Swab (NO TRANSPORT MEDIA) 2 HR TAT - Swab, Nasopharynx    Collection Time: 01/13/22  9:17 PM    Specimen: Nasopharynx; Swab   Result Value Ref Range    COVID19 Presumptive Negative Presumptive Negative - Ref. Range   Type & Screen    Collection Time: 01/13/22  9:55 PM    Specimen: Blood   Result Value Ref Range    ABO Type O     RH type Positive     Antibody Screen Negative     T&S Expiration Date 1/16/2022 11:59:59 PM      Note: In addition to lab results from this visit, the labs listed above may include labs taken at another facility or during a different encounter within the last 24 hours. Please correlate lab times with ED admission and discharge times for further clarification of the services performed during this visit.    US Ob Transvaginal   Final Result   1.  Findings highly concerning for left tubal ectopic pregnancy.   2.  IUD normally positioned within otherwise normal-appearing endometrial cavity and uterus. No fluid within the endometrial cavity.   3.  Moderate volume free pelvic fluid that appears hemorrhagic.   4.  Suspected hematoma adjacent to the left ovary within which a small cystlike structure is seen. While ectopic gestation is not directly visualized, the findings are highly concerning for left tubal ectopic pregnancy.      NOTIFICATION: Critical Value/emergent results were telephoned by me to the ordering ED provider, Yanique Keys PA-C, at 2103 hours on 1/13/2022.      Signer Name: Catrachito Billy MD    Signed: 1/13/2022 9:04 PM     "Workstation Name: [id number]     [facility]        Vitals:    01/14/22 0007 01/14/22 0017 01/14/22 0100 01/14/22 0300   BP:  131/76  128/80   BP Location:  Left arm  Left arm   Patient Position:  Lying  Lying   Pulse:  98 90 90   Resp:  18  18   Temp:  98.5 °F (36.9 °C)  98.3 °F (36.8 °C)   TempSrc:  Oral  Oral   SpO2: 100% 100% 100% 100%   Weight:  59.3 kg (130 lb 12.8 oz)     Height:  165.1 cm (65\")       Medications   lactated ringers infusion (100 mL/hr Intravenous Currently Infusing 1/14/22 0302)   HYDROmorphone (DILAUDID) injection 0.5 mg (has no administration in time range)     And   naloxone (NARCAN) injection 0.1 mg (has no administration in time range)   docusate sodium (COLACE) capsule 100 mg (100 mg Oral Given 1/14/22 0111)   polyethylene glycol (MIRALAX) packet 17 g (has no administration in time range)   simethicone (MYLICON) chewable tablet 80 mg (has no administration in time range)   ondansetron (ZOFRAN) tablet 4 mg (has no administration in time range)     Or   ondansetron (ZOFRAN) injection 4 mg (has no administration in time range)   metoclopramide (REGLAN) injection 10 mg (has no administration in time range)   ketorolac (TORADOL) injection 15 mg (15 mg Intravenous Given 1/14/22 0111)   HYDROcodone-acetaminophen (NORCO) 5-325 MG per tablet 1 tablet (has no administration in time range)   HYDROcodone-acetaminophen (NORCO)  MG per tablet 1 tablet (1 tablet Oral Given 1/14/22 0301)   sodium chloride 0.9 % bolus 1,000 mL (0 mL Intravenous Stopped 1/14/22 0029)   famotidine (PEPCID) injection 20 mg (20 mg Intravenous Given 1/13/22 2203)     ECG/EMG Results (last 24 hours)     ** No results found for the last 24 hours. **        ECG 12 Lead    (Results Pending)                                             MDM    Final diagnoses:   Ruptured left tubal ectopic pregnancy causing hemoperitoneum   Vaginal bleeding   Suprapubic abdominal pain   History of use of " contraceptive intrauterine device (IUD)       ED Disposition  ED Disposition     ED Disposition Condition Comment    Send to OR            No follow-up provider specified.       Medication List      No changes were made to your prescriptions during this visit.          Perri Keys PA-C  01/14/22 0433

## 2022-01-14 NOTE — PLAN OF CARE
Problem: Adult Inpatient Plan of Care  Goal: Plan of Care Review  Outcome: Met  Flowsheets (Taken 1/14/2022 0038)  Progress: improving  Plan of Care Reviewed With: patient  Outcome Summary: S/O is at bedside. Potassium given. VSS and on RA.  Goal: Patient-Specific Goal (Individualized)  Outcome: Met  Goal: Absence of Hospital-Acquired Illness or Injury  Outcome: Met  Goal: Optimal Comfort and Wellbeing  Outcome: Met  Goal: Readiness for Transition of Care  Outcome: Met     Problem: Pain Acute  Goal: Optimal Pain Control  Outcome: Met   Goal Outcome Evaluation:  Plan of Care Reviewed With: patient        Progress: improving  Outcome Summary: S/O is at bedside. Potassium given. VSS and on RA.

## 2022-01-14 NOTE — PROGRESS NOTES
1/14/2022    Name:Kimberly Erazo    MR#:[id number]     PROGRESS NOTE:  Post-Op      Subjective   36 y.o. yo Female, POD 1, doing well. Pain well controlled. Ambulating, voiding, tolerating general diet    Patient Active Problem List   Diagnosis   • Ruptured left tubal ectopic pregnancy causing hemoperitoneum   • Blood loss anemia   • Ectopic pregnancy        Objective    Vitals  Temp:  Temp:  [97.4 °F (36.3 °C)-98.6 °F (37 °C)] 98.5 °F (36.9 °C)  Temp src: Oral  BP:  BP: (106-131)/() 106/79  Pulse:  Heart Rate:  [] 75  RR:   Resp:  [16-20] 16    General Awake, alert, no distress  Abdomen Soft, non-distended, appropriately tender, +BS  Incision  Intact, no erythema or exudate  Extremities Calves NT bilaterally     I/O last 3 completed shifts:  In: 1718 [I.V.:1718]  Out: -     LABS:   Lab Results   Component Value Date    WBC 9.74 01/14/2022    HGB 10.1 (L) 01/14/2022    HCT 31.6 (L) 01/14/2022    MCV 85.2 01/14/2022     01/14/2022             Assessment   1.  POD 1 from laparoscopic LSO for ruptured ovarian ectopic pregnancy  2. AFVSS  3. Mild hypokalemia    Plan:   1. Replace potassium  2. Start po iron  3. D/c home  4. Instructions reviewed. Precautions reviewed.   5. RTC in 2w for postop.  Phone number given        Mone Rivera MD  1/14/2022 11:14 EST

## 2022-01-14 NOTE — ANESTHESIA PROCEDURE NOTES
Airway  Urgency: elective    Date/Time: 1/13/2022 10:16 PM  Airway not difficult    General Information and Staff    Patient location during procedure: OR    Indications and Patient Condition  Indications for airway management: airway protection    Preoxygenated: yes  MILS not maintained throughout  Mask difficulty assessment: 1 - vent by mask    Final Airway Details  Final airway type: endotracheal airway      Successful airway: ETT  Cuffed: yes   Successful intubation technique: direct laryngoscopy  Facilitating devices/methods: intubating stylet  Endotracheal tube insertion site: oral  Blade: Nati  Blade size: 3  ETT size (mm): 7.0  Cormack-Lehane Classification: grade I - full view of glottis  Placement verified by: chest auscultation and capnometry   Measured from: lips  ETT/EBT  to lips (cm): 20  Number of attempts at approach: 1  Assessment: lips, teeth, and gum same as pre-op and atraumatic intubation    Additional Comments  Negative epigastric sounds, Breath sound equal bilaterally with symmetric chest rise and fall

## 2022-01-14 NOTE — PLAN OF CARE
Goal Outcome Evaluation:           Progress: no change  Outcome Summary: Pt arrived from PACU. No complaints at this time. VSS on room air. Will continue plan of care. 0030  1/14/2022

## 2022-01-17 ENCOUNTER — TRANSITIONAL CARE MANAGEMENT TELEPHONE ENCOUNTER (OUTPATIENT)
Dept: CALL CENTER | Facility: HOSPITAL | Age: 37
End: 2022-01-17

## 2022-01-17 LAB
CYTO UR: NORMAL
LAB AP CASE REPORT: NORMAL
LAB AP CLINICAL INFORMATION: NORMAL
PATH REPORT.FINAL DX SPEC: NORMAL
PATH REPORT.GROSS SPEC: NORMAL

## 2022-01-17 NOTE — OUTREACH NOTE
Call Center TCM Note      Responses   [facility] patient discharged from? [facility]   Does the patient have one of the following disease processes/diagnoses(primary or secondary)? General Surgery   TCM attempt successful? Yes   Call start time 0949   Call end time 0956   Discharge diagnosis s/p emergent laparoscopic left salpingectomy   Is patient permission given to speak with other caregiver? Yes   List who call center can speak with spouse- Artemio   Person spoke with today (if not patient) and relationship spouse   Meds reviewed with patient/caregiver? Yes   Is the patient having any side effects they believe may be caused by any medication additions or changes? No   Does the patient have all medications related to this admission filled (includes all antibiotics, pain medications, etc.) Yes   Is the patient taking all medications as directed (includes completed medication regime)? Yes   Does the patient have a follow up appointment scheduled with their surgeon? No   What is preventing the patient from scheduling follow up appointments? Waiting on return call   Nursing Interventions Advised patient to make appointment   Has the patient kept scheduled appointments due by today? N/A   Comments new patient appt with MARIANA Peralta on 1/27   Has home health visited the patient within 72 hours of discharge? N/A   Psychosocial issues? No   Did the patient receive a copy of their discharge instructions? Yes   Nursing interventions Reviewed instructions with patient  [with spouse]   What is the patient's perception of their health status since discharge? Improving   Nursing interventions Nurse provided patient education   Is the patient /caregiver able to teach back basic post-op care? Continue use of incentive spirometry at least 1 week post discharge,  Practice 'cough and deep breath',  Drive as instructed by MD in discharge instructions,  No tub bath, swimming, or hot tub until instructed by MD,  Take  showers only when approved by MD-sponge bathe until then,  Keep incision areas clean,dry and protected,  Do not remove steri-strips,  Lifting as instructed by MD in discharge instructions   Is the patient/caregiver able to teach back signs and symptoms of incisional infection? Fever   Is the patient/caregiver able to teach back steps to recovery at home? Set small, achievable goals for return to baseline health,  Rest and rebuild strength, gradually increase activity   Is the patient/caregiver able to teach back the hierarchy of who to call/visit for symptoms/problems? PCP, Specialist, Home health nurse, Urgent Care, ED, 911 Yes   TCM call completed? Yes   Wrap up additional comments Per spouse, patient is doing well, he has been trying to reach Dr. Rivera office, reviewed office number with spouse and advised him to keep calling office, confirmed appt with MARIANA Peralta for 1/27.          Samira Galindo RN    1/17/2022, 09:56 EST

## 2022-01-27 ENCOUNTER — OFFICE VISIT (OUTPATIENT)
Dept: FAMILY MEDICINE CLINIC | Facility: CLINIC | Age: 37
End: 2022-01-27

## 2022-01-27 VITALS
BODY MASS INDEX: 22.29 KG/M2 | OXYGEN SATURATION: 100 % | SYSTOLIC BLOOD PRESSURE: 120 MMHG | DIASTOLIC BLOOD PRESSURE: 70 MMHG | RESPIRATION RATE: 20 BRPM | HEART RATE: 89 BPM | HEIGHT: 65 IN | WEIGHT: 133.8 LBS | TEMPERATURE: 98.2 F

## 2022-01-27 DIAGNOSIS — Z13.6 ENCOUNTER FOR LIPID SCREENING FOR CARDIOVASCULAR DISEASE: ICD-10-CM

## 2022-01-27 DIAGNOSIS — D64.9 ANEMIA, UNSPECIFIED TYPE: ICD-10-CM

## 2022-01-27 DIAGNOSIS — Z13.29 SCREENING FOR THYROID DISORDER: ICD-10-CM

## 2022-01-27 DIAGNOSIS — Z13.220 ENCOUNTER FOR LIPID SCREENING FOR CARDIOVASCULAR DISEASE: ICD-10-CM

## 2022-01-27 DIAGNOSIS — Z90.721 STATUS POST UNILATERAL SALPINGO-OOPHORECTOMY: Primary | ICD-10-CM

## 2022-01-27 DIAGNOSIS — Z97.5 IUD (INTRAUTERINE DEVICE) IN PLACE: ICD-10-CM

## 2022-01-27 LAB
ALBUMIN SERPL-MCNC: 4.4 G/DL (ref 3.5–5.2)
ALBUMIN/GLOB SERPL: 1.7 G/DL
ALP SERPL-CCNC: 75 U/L (ref 39–117)
ALT SERPL-CCNC: 13 U/L (ref 1–33)
AST SERPL-CCNC: 12 U/L (ref 1–32)
BASOPHILS # BLD AUTO: 0.01 10*3/MM3 (ref 0–0.2)
BASOPHILS NFR BLD AUTO: 0.2 % (ref 0–1.5)
BILIRUB SERPL-MCNC: 0.4 MG/DL (ref 0–1.2)
BUN SERPL-MCNC: 9 MG/DL (ref 6–20)
BUN/CREAT SERPL: 13.2 (ref 7–25)
CALCIUM SERPL-MCNC: 9.6 MG/DL (ref 8.6–10.5)
CHLORIDE SERPL-SCNC: 107 MMOL/L (ref 98–107)
CHOLEST SERPL-MCNC: 186 MG/DL (ref 0–200)
CO2 SERPL-SCNC: 24.2 MMOL/L (ref 22–29)
CREAT SERPL-MCNC: 0.68 MG/DL (ref 0.57–1)
EOSINOPHIL # BLD AUTO: 0.13 10*3/MM3 (ref 0–0.4)
EOSINOPHIL NFR BLD AUTO: 2.3 % (ref 0.3–6.2)
ERYTHROCYTE [DISTWIDTH] IN BLOOD BY AUTOMATED COUNT: 13.8 % (ref 12.3–15.4)
FERRITIN SERPL-MCNC: 21.1 NG/ML (ref 13–150)
GLOBULIN SER CALC-MCNC: 2.6 GM/DL
GLUCOSE SERPL-MCNC: 88 MG/DL (ref 65–99)
HCT VFR BLD AUTO: 35.4 % (ref 34–46.6)
HDLC SERPL-MCNC: 41 MG/DL (ref 40–60)
HGB BLD-MCNC: 11.1 G/DL (ref 12–15.9)
IMM GRANULOCYTES # BLD AUTO: 0.01 10*3/MM3 (ref 0–0.05)
IMM GRANULOCYTES NFR BLD AUTO: 0.2 % (ref 0–0.5)
IRON SATN MFR SERPL: 21 % (ref 20–50)
IRON SERPL-MCNC: 120 MCG/DL (ref 37–145)
LDLC SERPL CALC-MCNC: 113 MG/DL (ref 0–100)
LYMPHOCYTES # BLD AUTO: 1.72 10*3/MM3 (ref 0.7–3.1)
LYMPHOCYTES NFR BLD AUTO: 30.7 % (ref 19.6–45.3)
MCH RBC QN AUTO: 26.6 PG (ref 26.6–33)
MCHC RBC AUTO-ENTMCNC: 31.4 G/DL (ref 31.5–35.7)
MCV RBC AUTO: 84.7 FL (ref 79–97)
MONOCYTES # BLD AUTO: 0.29 10*3/MM3 (ref 0.1–0.9)
MONOCYTES NFR BLD AUTO: 5.2 % (ref 5–12)
NEUTROPHILS # BLD AUTO: 3.45 10*3/MM3 (ref 1.7–7)
NEUTROPHILS NFR BLD AUTO: 61.4 % (ref 42.7–76)
NRBC BLD AUTO-RTO: 0 /100 WBC (ref 0–0.2)
PLATELET # BLD AUTO: 216 10*3/MM3 (ref 140–450)
POTASSIUM SERPL-SCNC: 4.1 MMOL/L (ref 3.5–5.2)
PROT SERPL-MCNC: 7 G/DL (ref 6–8.5)
RBC # BLD AUTO: 4.18 10*6/MM3 (ref 3.77–5.28)
SODIUM SERPL-SCNC: 141 MMOL/L (ref 136–145)
TIBC SERPL-MCNC: 559 MCG/DL
TRIGL SERPL-MCNC: 183 MG/DL (ref 0–150)
TSH SERPL-ACNC: 1.87 UIU/ML (ref 0.27–4.2)
UIBC SERPL-MCNC: 439 MCG/DL (ref 112–346)
VLDLC SERPL CALC-MCNC: 32 MG/DL (ref 5–40)
WBC # BLD AUTO: 5.61 10*3/MM3 (ref 3.4–10.8)

## 2022-01-27 PROCEDURE — 99203 OFFICE O/P NEW LOW 30 MIN: CPT | Performed by: PHYSICIAN ASSISTANT

## 2022-11-29 ENCOUNTER — PATIENT ROUNDING (BHMG ONLY) (OUTPATIENT)
Dept: FAMILY MEDICINE CLINIC | Facility: CLINIC | Age: 37
End: 2022-11-29

## 2022-11-29 ENCOUNTER — OFFICE VISIT (OUTPATIENT)
Dept: FAMILY MEDICINE CLINIC | Facility: CLINIC | Age: 37
End: 2022-11-29

## 2022-11-29 VITALS
DIASTOLIC BLOOD PRESSURE: 80 MMHG | HEART RATE: 119 BPM | OXYGEN SATURATION: 100 % | HEIGHT: 65 IN | BODY MASS INDEX: 22.16 KG/M2 | WEIGHT: 133 LBS | SYSTOLIC BLOOD PRESSURE: 130 MMHG | TEMPERATURE: 97.5 F

## 2022-11-29 DIAGNOSIS — I88.9 LYMPHADENITIS: ICD-10-CM

## 2022-11-29 DIAGNOSIS — K04.7 DENTAL INFECTION: Primary | ICD-10-CM

## 2022-11-29 PROCEDURE — 99213 OFFICE O/P EST LOW 20 MIN: CPT | Performed by: PHYSICIAN ASSISTANT

## 2022-11-29 RX ORDER — CELECOXIB 200 MG/1
200 CAPSULE ORAL DAILY
Qty: 7 CAPSULE | Refills: 0 | Status: SHIPPED | OUTPATIENT
Start: 2022-11-29 | End: 2023-02-23

## 2022-11-29 RX ORDER — AMOXICILLIN AND CLAVULANATE POTASSIUM 875; 125 MG/1; MG/1
1 TABLET, FILM COATED ORAL 2 TIMES DAILY
Qty: 20 TABLET | Refills: 0 | Status: SHIPPED | OUTPATIENT
Start: 2022-11-29 | End: 2023-02-23

## 2022-11-29 NOTE — PROGRESS NOTES
"    Chief Complaint   Patient presents with   • new patient preventive medicine service   • Mouth Lesions     Inside of mouth  3 days       HPI     Kimberly Erazo is a pleasant 37 y.o. female who presents for evaluation of \"chief complaint.\"     She has not established with a primary care provider since moving from [place] 2 years ago.   Patient c/o a rash inside on the inside of her left mouth starting 3 days ago. She was having some pain with chewing that improved with ibuprofen but returned today. Does not follow with a dentist since moving from [place] 2 years ago. Her last molar on the lower left is somewhat painful as well.     History reviewed. No pertinent past medical history.    Past Surgical History:   Procedure Laterality Date   • DIAGNOSTIC LAPAROSCOPY N/A 1/13/2022    Procedure: DIAGNOSTIC LAPAROSCOPY, LEFT SALPINGO-OOPHORECTOMY;  Surgeon: Mone Rivera MD;  Location: [facility];  Service: Obstetrics/Gynecology;  Laterality: N/A;   • INTRAUTERINE DEVICE INSERTION         Family History   Problem Relation Age of Onset   • Cancer Mother         breast        Social History     Socioeconomic History   • Marital status:    Tobacco Use   • Smoking status: Never   • Smokeless tobacco: Never   Vaping Use   • Vaping Use: Never used   Substance and Sexual Activity   • Alcohol use: Yes     Alcohol/week: 2.0 standard drinks     Types: 2 Glasses of wine per week     Comment: OCCASIONALLY   • Drug use: Never   • Sexual activity: Yes     Partners: Male     Birth control/protection: Condom       No Known Allergies    ROS    Review of Systems   Constitutional: Negative for chills and fever.   HENT: Positive for dental problem and mouth sores.        Vitals:    11/29/22 1124   BP: 130/80   Pulse: 119   Temp: 97.5 °F (36.4 °C)   SpO2: 100%     Body mass index is 22.13 kg/m².      Current Outpatient Medications:   •  ferrous sulfate 325 (65 FE) MG tablet, Take 1 tablet by mouth Daily With Breakfast., Disp: 30 " tablet, Rfl: 0  •  IUD'S IU, by Intrauterine route. X 4 years, Disp: , Rfl:   •  amoxicillin-clavulanate (AUGMENTIN) 875-125 MG per tablet, Take 1 tablet by mouth 2 (Two) Times a Day., Disp: 20 tablet, Rfl: 0  •  celecoxib (CeleBREX) 200 MG capsule, Take 1 capsule by mouth Daily., Disp: 7 capsule, Rfl: 0    PE    Physical Exam  Vitals reviewed.   Constitutional:       General: She is not in acute distress.  HENT:      Mouth/Throat:        Comments: Soft tissue erythema and tenderness surrounding the last lower molar on the left. She has discomfort when the area is touched with a tongue depressor. I do not see any buccal lesions.   Neck:     Pulmonary:      Effort: Pulmonary effort is normal. No respiratory distress.   Neurological:      Mental Status: She is alert.   Psychiatric:         Mood and Affect: Mood normal.          A/P    Problem List Items Addressed This Visit    None  Visit Diagnoses     Dental infection    -  Primary  -Treat with augmentin and celebrex for pain.   -Advised follow-up with dentist as soon as she can.   -RTC if symptoms worsen or persist.     Relevant Medications    amoxicillin-clavulanate (AUGMENTIN) 875-125 MG per tablet    Lymphadenitis              Plan of care was reviewed with patient at the conclusion of today's visit. Education was provided regarding diagnoses, management, prescribed or recommended OTC products, and the importance of compliance with follow-up appointments. The patient was counseled regarding the risks, benefits, and possible side-effects of treatment. I advised the patient to keep me informed of any acute changes in their status including new, worsening, or persistent symptoms. Patient expresses understanding and agreement with the management plan.        MARIANA Ocampo

## 2023-02-21 ENCOUNTER — TELEPHONE (OUTPATIENT)
Dept: FAMILY MEDICINE CLINIC | Facility: CLINIC | Age: 38
End: 2023-02-21
Payer: COMMERCIAL

## 2023-02-23 ENCOUNTER — OFFICE VISIT (OUTPATIENT)
Dept: FAMILY MEDICINE CLINIC | Facility: CLINIC | Age: 38
End: 2023-02-23
Payer: COMMERCIAL

## 2023-02-23 VITALS
BODY MASS INDEX: 21.49 KG/M2 | SYSTOLIC BLOOD PRESSURE: 122 MMHG | OXYGEN SATURATION: 100 % | HEART RATE: 115 BPM | HEIGHT: 65 IN | DIASTOLIC BLOOD PRESSURE: 82 MMHG | WEIGHT: 129 LBS

## 2023-02-23 DIAGNOSIS — B37.2 CANDIDAL SKIN INFECTION: Primary | ICD-10-CM

## 2023-02-23 PROBLEM — F41.9 ANXIETY: Status: ACTIVE | Noted: 2020-11-05

## 2023-02-23 PROCEDURE — 99213 OFFICE O/P EST LOW 20 MIN: CPT | Performed by: NURSE PRACTITIONER

## 2023-02-23 RX ORDER — NYSTATIN 100000 [USP'U]/G
POWDER TOPICAL 3 TIMES DAILY
Qty: 60 G | Refills: 0 | Status: SHIPPED | OUTPATIENT
Start: 2023-02-23

## 2023-02-23 NOTE — PROGRESS NOTES
"Chief Complaint  Rectal Bleeding (Pt states she has had a little blood in her stool. And also a itchy feeling around her anus. ) and Rash (Pt states she has a rash on her bottom and she states that its itchy)    Subjective        Kimberly Erazo presents to [facility]  History of Present Illness  Pt is here today for concerns of rash on bilateral buttocks. She reports that she has experienced bright red blood in stool and she is experiencing some pain with bowel movements. She is having daily bowel movements; reports that she is not having difficulty with bowel movements and bowel movements have been normal.  She has seen dermatology for the rash and was prescribed anti-fungal creams to help. She states this did not help.   Rectal Bleeding  This is a new problem. The current episode started more than 1 month ago. The problem occurs intermittently. She has tried nothing for the symptoms.     Objective   Vital Signs:  /82   Pulse 115   Ht 165.1 cm (65\")   Wt 58.5 kg (129 lb)   SpO2 100%   BMI 21.47 kg/m²   Estimated body mass index is 21.47 kg/m² as calculated from the following:    Height as of this encounter: 165.1 cm (65\").    Weight as of this encounter: 58.5 kg (129 lb).       BMI is within normal parameters. No other follow-up for BMI required.      Physical Exam  Vitals reviewed.   Constitutional:       Appearance: Normal appearance.   HENT:      Nose: Nose normal.      Mouth/Throat:      Mouth: Mucous membranes are moist.      Pharynx: Oropharynx is clear.   Eyes:      Conjunctiva/sclera: Conjunctivae normal.   Cardiovascular:      Rate and Rhythm: Normal rate and regular rhythm.      Heart sounds: Normal heart sounds.   Pulmonary:      Effort: Pulmonary effort is normal.      Breath sounds: Normal breath sounds.   Abdominal:      General: Bowel sounds are normal.   Genitourinary:     Comments: Rash around anal region spreading out to buttocks. External hemorrhoids " also present.  Musculoskeletal:         General: Normal range of motion.      Cervical back: Normal range of motion.   Skin:     General: Skin is warm.   Neurological:      Mental Status: She is alert and oriented to person, place, and time.   Psychiatric:         Mood and Affect: Mood normal.         Behavior: Behavior normal.         Thought Content: Thought content normal.        Result Review :                 Assessment and Plan   Diagnoses and all orders for this visit:    1. Candidal skin infection (Primary)  -     nystatin (MYCOSTATIN) 300137 UNIT/GM powder; Apply  topically to the appropriate area as directed 3 (Three) Times a Day.  Dispense: 60 g; Refill: 0             Follow Up   No follow-ups on file.  Patient was given instructions and counseling regarding her condition or for health maintenance advice. Please see specific information pulled into the AVS if appropriate.       Answers for HPI/ROS submitted by the patient on 2/23/2023  Please describe your symptoms.: More itching on  butt and feels like crack while going for bowl , more pain after I finish stool . And little or no blood while pooping , this could be crack at annal shelia , that is my guess .  Have you had these symptoms before?: No  How long have you been having these symptoms?: Greater than 2 weeks  What is the primary reason for your visit?: Other

## 2023-12-04 ENCOUNTER — OFFICE VISIT (OUTPATIENT)
Dept: FAMILY MEDICINE CLINIC | Facility: CLINIC | Age: 38
End: 2023-12-04
Payer: COMMERCIAL

## 2023-12-04 ENCOUNTER — LAB (OUTPATIENT)
Dept: LAB | Facility: HOSPITAL | Age: 38
End: 2023-12-04
Payer: COMMERCIAL

## 2023-12-04 VITALS
SYSTOLIC BLOOD PRESSURE: 116 MMHG | HEIGHT: 65 IN | OXYGEN SATURATION: 100 % | DIASTOLIC BLOOD PRESSURE: 70 MMHG | HEART RATE: 63 BPM | BODY MASS INDEX: 22.53 KG/M2 | WEIGHT: 135.2 LBS

## 2023-12-04 DIAGNOSIS — Z00.00 HEALTHCARE MAINTENANCE: ICD-10-CM

## 2023-12-04 DIAGNOSIS — M79.631 RIGHT FOREARM PAIN: ICD-10-CM

## 2023-12-04 DIAGNOSIS — Z00.00 HEALTHCARE MAINTENANCE: Primary | ICD-10-CM

## 2023-12-04 DIAGNOSIS — M25.562 ACUTE PAIN OF LEFT KNEE: ICD-10-CM

## 2023-12-04 DIAGNOSIS — Z12.31 ENCOUNTER FOR SCREENING MAMMOGRAM FOR MALIGNANT NEOPLASM OF BREAST: ICD-10-CM

## 2023-12-04 PROBLEM — Z97.5 IUD (INTRAUTERINE DEVICE) IN PLACE: Status: ACTIVE | Noted: 2023-12-04

## 2023-12-04 LAB
25(OH)D3 SERPL-MCNC: 18.6 NG/ML (ref 30–100)
ALBUMIN SERPL-MCNC: 4.7 G/DL (ref 3.5–5.2)
ALBUMIN/GLOB SERPL: 1.8 G/DL
ALP SERPL-CCNC: 64 U/L (ref 39–117)
ALT SERPL W P-5'-P-CCNC: 32 U/L (ref 1–33)
ANION GAP SERPL CALCULATED.3IONS-SCNC: 13 MMOL/L (ref 5–15)
ARTICHOKE IGE QN: 162 MG/DL (ref 0–100)
AST SERPL-CCNC: 27 U/L (ref 1–32)
BACTERIA UR QL AUTO: ABNORMAL /HPF
BILIRUB SERPL-MCNC: 0.4 MG/DL (ref 0–1.2)
BILIRUB UR QL STRIP: NEGATIVE
BUN SERPL-MCNC: 10 MG/DL (ref 6–20)
BUN/CREAT SERPL: 14.3 (ref 7–25)
CALCIUM SPEC-SCNC: 9.5 MG/DL (ref 8.6–10.5)
CHLORIDE SERPL-SCNC: 105 MMOL/L (ref 98–107)
CLARITY UR: CLEAR
CO2 SERPL-SCNC: 21 MMOL/L (ref 22–29)
COLOR UR: YELLOW
CREAT SERPL-MCNC: 0.7 MG/DL (ref 0.57–1)
DEPRECATED RDW RBC AUTO: 44 FL (ref 37–54)
EGFRCR SERPLBLD CKD-EPI 2021: 112.3 ML/MIN/1.73
ERYTHROCYTE [DISTWIDTH] IN BLOOD BY AUTOMATED COUNT: 15.1 % (ref 12.3–15.4)
GLOBULIN UR ELPH-MCNC: 2.6 GM/DL
GLUCOSE SERPL-MCNC: 89 MG/DL (ref 65–99)
GLUCOSE UR STRIP-MCNC: NEGATIVE MG/DL
HBA1C MFR BLD: 5.7 % (ref 4.8–5.6)
HCT VFR BLD AUTO: 36.2 % (ref 34–46.6)
HCV AB SER DONR QL: NORMAL
HGB BLD-MCNC: 11.8 G/DL (ref 12–15.9)
HGB UR QL STRIP.AUTO: NEGATIVE
HOLD SPECIMEN: NORMAL
HYALINE CASTS UR QL AUTO: ABNORMAL /LPF
KETONES UR QL STRIP: NEGATIVE
LEUKOCYTE ESTERASE UR QL STRIP.AUTO: ABNORMAL
MCH RBC QN AUTO: 26.3 PG (ref 26.6–33)
MCHC RBC AUTO-ENTMCNC: 32.6 G/DL (ref 31.5–35.7)
MCV RBC AUTO: 80.6 FL (ref 79–97)
NITRITE UR QL STRIP: NEGATIVE
PH UR STRIP.AUTO: 6.5 [PH] (ref 5–8)
PLATELET # BLD AUTO: 189 10*3/MM3 (ref 140–450)
PMV BLD AUTO: 11.8 FL (ref 6–12)
POTASSIUM SERPL-SCNC: 3.9 MMOL/L (ref 3.5–5.2)
PROT SERPL-MCNC: 7.3 G/DL (ref 6–8.5)
PROT UR QL STRIP: NEGATIVE
RBC # BLD AUTO: 4.49 10*6/MM3 (ref 3.77–5.28)
RBC # UR STRIP: ABNORMAL /HPF
REF LAB TEST METHOD: ABNORMAL
SODIUM SERPL-SCNC: 139 MMOL/L (ref 136–145)
SP GR UR STRIP: 1.01 (ref 1–1.03)
SQUAMOUS #/AREA URNS HPF: ABNORMAL /HPF
TSH SERPL DL<=0.05 MIU/L-ACNC: 3.16 UIU/ML (ref 0.27–4.2)
UROBILINOGEN UR QL STRIP: ABNORMAL
WBC # UR STRIP: ABNORMAL /HPF
WBC NRBC COR # BLD AUTO: 8.14 10*3/MM3 (ref 3.4–10.8)

## 2023-12-04 PROCEDURE — 82746 ASSAY OF FOLIC ACID SERUM: CPT | Performed by: PHYSICIAN ASSISTANT

## 2023-12-04 PROCEDURE — 80053 COMPREHEN METABOLIC PANEL: CPT

## 2023-12-04 PROCEDURE — 85027 COMPLETE CBC AUTOMATED: CPT

## 2023-12-04 PROCEDURE — 82728 ASSAY OF FERRITIN: CPT | Performed by: PHYSICIAN ASSISTANT

## 2023-12-04 PROCEDURE — 83540 ASSAY OF IRON: CPT | Performed by: PHYSICIAN ASSISTANT

## 2023-12-04 PROCEDURE — 82607 VITAMIN B-12: CPT | Performed by: PHYSICIAN ASSISTANT

## 2023-12-04 PROCEDURE — 83721 ASSAY OF BLOOD LIPOPROTEIN: CPT

## 2023-12-04 PROCEDURE — 83036 HEMOGLOBIN GLYCOSYLATED A1C: CPT

## 2023-12-04 PROCEDURE — 82306 VITAMIN D 25 HYDROXY: CPT

## 2023-12-04 PROCEDURE — 84466 ASSAY OF TRANSFERRIN: CPT | Performed by: PHYSICIAN ASSISTANT

## 2023-12-04 PROCEDURE — 87086 URINE CULTURE/COLONY COUNT: CPT

## 2023-12-04 PROCEDURE — 84443 ASSAY THYROID STIM HORMONE: CPT

## 2023-12-04 PROCEDURE — 81001 URINALYSIS AUTO W/SCOPE: CPT

## 2023-12-04 PROCEDURE — 86803 HEPATITIS C AB TEST: CPT

## 2023-12-04 RX ORDER — COPPER 313.4 MG/1
INTRAUTERINE DEVICE INTRAUTERINE
COMMUNITY

## 2023-12-04 NOTE — PROGRESS NOTES
Reason for visit    Kimberly Erazo is a 38 y.o. female who presents for annual comprehensive exam.    Chief Complaint   Patient presents with    Annual Exam     Might want labs        HPI     Here for physical.     She c/o intermittent right forearm pain for 1 week without injury. She works in IT and on a computer daily. Worse with doing dishes and lifting heavier items. She has tried topical pain relief creams and sprays with some improvement. She is right hand dominant. Denies tingling, numbness, and weakness.     Also 1 month of left knee pain with walking for longer time periods. Aching. No pain at rest or swelling. No injury.      Diet/Physical activity:  -Rice, chicken, eggs.   -Walks in the summer, not as much in winter.     Immunizations:  -She may have had a tetanus vaccine during pregnancy 6 years ago.   -Declines influenza vaccine.   -Declines updated COVID vaccine.     Cancer screening:   -Positive Fhx: breast cancer (mother).   -Negative Fhx: ovarian, colon cancer, colon polyps.   -No prior mammogram.   -Follows with gynecology. She was seen in March. Denies prior PAP smear.     PHQ-9 Depression Screening  Little interest or pleasure in doing things? 0-->not at all   Feeling down, depressed, or hopeless? 0-->not at all   Trouble falling or staying asleep, or sleeping too much?     Feeling tired or having little energy?     Poor appetite or overeating?     Feeling bad about yourself - or that you are a failure or have let yourself or your family down?     Trouble concentrating on things, such as reading the newspaper or watching television?     Moving or speaking so slowly that other people could have noticed? Or the opposite - being so fidgety or restless that you have been moving around a lot more than usual?     Thoughts that you would be better off dead, or of hurting yourself in some way?     PHQ-9 Total Score 0   If you checked off any problems, how difficult have these problems made it for you  to do your work, take care of things at home, or get along with other people?           Substance use:  -Denies tobacco use.   -1-2 glasses of wine/week.   -Denies recreational drug use.   -1 cup coffee/day.     Sexual health:  -Monogamous relationship x 10 years.   -Reports regular menstrual cycles. Last 3-4 days. LMP 11/15.    Dental/vision/skin:  -Overdue for dental and eye exams.   -Denies vision concerns.   -Denies new/changing/concerning skin lesions.     History reviewed. No pertinent past medical history.    Past Surgical History:   Procedure Laterality Date    DIAGNOSTIC LAPAROSCOPY N/A 1/13/2022    Procedure: DIAGNOSTIC LAPAROSCOPY, LEFT SALPINGO-OOPHORECTOMY;  Surgeon: Mone Rivera MD;  Location: [facility];  Service: Obstetrics/Gynecology;  Laterality: N/A;    INTRAUTERINE DEVICE INSERTION         Family History   Problem Relation Age of Onset    Cancer Mother         breast        Social History     Socioeconomic History    Marital status:    Tobacco Use    Smoking status: Never    Smokeless tobacco: Never   Vaping Use    Vaping Use: Never used   Substance and Sexual Activity    Alcohol use: Not Currently     Alcohol/week: 2.0 standard drinks of alcohol     Types: 2 Glasses of wine per week     Comment: OCCASIONALLY    Drug use: Never    Sexual activity: Yes     Partners: Male     Birth control/protection: Condom       No Known Allergies    ROS    Review of Systems   Constitutional:  Negative for appetite change, chills, diaphoresis, fatigue, fever and unexpected weight loss.   HENT:  Negative for congestion, hearing loss, sore throat, swollen glands and trouble swallowing.    Eyes:  Negative for blurred vision and visual disturbance.   Respiratory:  Negative for cough and shortness of breath.    Cardiovascular:  Negative for chest pain.   Gastrointestinal:  Negative for abdominal pain, blood in stool, constipation, diarrhea and GERD.   Endocrine: Negative for polydipsia.   Genitourinary:   Negative for breast lump, breast pain, difficulty urinating, dysuria, menstrual problem, pelvic pain and pelvic pressure.   Musculoskeletal:  Positive for arthralgias. Negative for joint swelling.   Skin:  Negative for rash and skin lesions.   Neurological:  Negative for dizziness, weakness, numbness and headache.   Hematological:  Negative for adenopathy.   Psychiatric/Behavioral:  Negative for sleep disturbance and depressed mood. The patient is not nervous/anxious.        Vitals:    12/04/23 0822   BP: 116/70   Pulse: 63   SpO2: 100%     Body mass index is 22.5 kg/m².      Current Outpatient Medications:     Paragard Intrauterine Copper intrauterine device IUD, as directed Intrauterine, Disp: , Rfl:     PE    Physical Exam  Vitals reviewed.   Constitutional:       General: She is not in acute distress.     Appearance: Normal appearance. She is well-developed.   HENT:      Head: Normocephalic and atraumatic.      Right Ear: Hearing, tympanic membrane and ear canal normal.      Left Ear: Hearing, tympanic membrane and ear canal normal.      Mouth/Throat:      Mouth: Mucous membranes are moist.      Dentition: Normal dentition.      Pharynx: Oropharynx is clear.   Eyes:      Conjunctiva/sclera: Conjunctivae normal.      Pupils: Pupils are equal, round, and reactive to light.   Neck:      Thyroid: No thyroid mass or thyromegaly.      Vascular: No carotid bruit.   Cardiovascular:      Rate and Rhythm: Normal rate and regular rhythm.      Heart sounds: Normal heart sounds, S1 normal and S2 normal. No murmur heard.  Pulmonary:      Effort: Pulmonary effort is normal.      Breath sounds: Normal breath sounds.   Abdominal:      General: Bowel sounds are normal. There is no abdominal bruit.      Palpations: Abdomen is soft. There is no mass.      Tenderness: There is no abdominal tenderness.   Musculoskeletal:         General: Normal range of motion.      Right forearm: No swelling, deformity or tenderness.         Arms:       Cervical back: Normal range of motion and neck supple.      Left knee: No effusion. Normal range of motion. No tenderness. No LCL laxity or MCL laxity.     Instability Tests: Anterior drawer test negative. Posterior drawer test negative. Medial Michelle test negative and lateral Michelle test negative.   Lymphadenopathy:      Cervical: No cervical adenopathy.      Upper Body:      Right upper body: No supraclavicular adenopathy.      Left upper body: No supraclavicular adenopathy.   Skin:     General: Skin is warm and dry.      Findings: No rash.      Nails: There is no clubbing.      Comments: No suspicious nevi on clothed exam.    Neurological:      Mental Status: She is alert.      Gait: Gait normal.      Deep Tendon Reflexes: Reflexes normal.   Psychiatric:         Speech: Speech normal.         Behavior: Behavior normal.         A/P    Problem List Items Addressed This Visit          Health Encounters    Healthcare maintenance - Primary    Current Assessment & Plan     -Counseled patient regarding cancer screening, immunizations, healthy lifestyle, diet, maintaining a healthy weight, and exercise.  -Annual dental, eye, and gynecologic exams were recommended.    -Check screening labs as ordered.   -RTC in 1 year for physical, sooner prn.          Relevant Orders    CBC (No Diff)    Comprehensive Metabolic Panel    TSH Rfx On Abnormal To Free T4    Urinalysis With Culture If Indicated - Urine, Clean Catch    Hemoglobin A1c    Vitamin D,25-Hydroxy    LDL cholesterol, direct    Hepatitis C Antibody     Other Visit Diagnoses       Encounter for screening mammogram for malignant neoplasm of breast        Relevant Orders    Mammo Screening Digital Tomosynthesis Bilateral With CAD    Acute pain of left knee        Normal exam. Refer to PT.    Relevant Orders    Ambulatory Referral to Physical Therapy Evaluate and treat    Right forearm pain        Discussed overuse syndrome/tendinitis. Trial voltaren gel  prn pain, rest. Consider PT if symptoms persist for 1 month or worsen.            Plan of care was reviewed with patient at the conclusion of today's visit. Education was provided regarding diagnoses, management, treatment plan, and the importance of keeping follow-up appointments. The patient was counseled regarding the risks, benefits, and possible side-effects of treatment. Patient and/or family expresses understanding and agreement with the management plan.        MARIANA Ocampo

## 2023-12-04 NOTE — ASSESSMENT & PLAN NOTE
-Counseled patient regarding cancer screening, immunizations, healthy lifestyle, diet, maintaining a healthy weight, and exercise.  -Annual dental, eye, and gynecologic exams were recommended.    -Check screening labs as ordered.   -RTC in 1 year for physical, sooner prn.

## 2023-12-05 ENCOUNTER — TELEPHONE (OUTPATIENT)
Dept: FAMILY MEDICINE CLINIC | Facility: CLINIC | Age: 38
End: 2023-12-05
Payer: COMMERCIAL

## 2023-12-05 DIAGNOSIS — D64.9 ANEMIA, UNSPECIFIED TYPE: Primary | ICD-10-CM

## 2023-12-05 PROBLEM — E53.8 VITAMIN B12 DEFICIENCY: Status: ACTIVE | Noted: 2023-12-05

## 2023-12-05 PROBLEM — E55.9 VITAMIN D DEFICIENCY: Status: ACTIVE | Noted: 2023-12-05

## 2023-12-05 PROBLEM — E78.5 HYPERLIPIDEMIA: Status: ACTIVE | Noted: 2023-12-05

## 2023-12-05 PROBLEM — R73.03 PREDIABETES: Status: ACTIVE | Noted: 2023-12-05

## 2023-12-05 LAB
BACTERIA SPEC AEROBE CULT: NORMAL
FERRITIN SERPL-MCNC: 7.99 NG/ML (ref 13–150)
FOLATE SERPL-MCNC: 11.5 NG/ML (ref 4.78–24.2)
IRON 24H UR-MRATE: 38 MCG/DL (ref 37–145)
IRON SATN MFR SERPL: 6 % (ref 20–50)
TIBC SERPL-MCNC: 597 MCG/DL (ref 298–536)
TRANSFERRIN SERPL-MCNC: 401 MG/DL (ref 200–360)
VIT B12 BLD-MCNC: 186 PG/ML (ref 211–946)

## 2023-12-05 RX ORDER — ERGOCALCIFEROL 1.25 MG/1
50000 CAPSULE ORAL WEEKLY
Qty: 12 CAPSULE | Refills: 0 | Status: SHIPPED | OUTPATIENT
Start: 2023-12-05 | End: 2024-02-21

## 2023-12-05 RX ORDER — FERROUS GLUCONATE 324(38)MG
324 TABLET ORAL
Qty: 90 TABLET | Refills: 1 | Status: SHIPPED | OUTPATIENT
Start: 2023-12-05

## 2023-12-06 NOTE — TELEPHONE ENCOUNTER
Called patient back. LVM notifying her I sent her a message on My Chart about her results and that I would be in clinic until 4 PM today if she would like to call back to speak with me.

## 2023-12-12 ENCOUNTER — CLINICAL SUPPORT (OUTPATIENT)
Dept: FAMILY MEDICINE CLINIC | Facility: CLINIC | Age: 38
End: 2023-12-12
Payer: COMMERCIAL

## 2023-12-12 DIAGNOSIS — E53.8 VITAMIN B12 DEFICIENCY: Primary | ICD-10-CM

## 2023-12-12 RX ORDER — CYANOCOBALAMIN 1000 UG/ML
1000 INJECTION, SOLUTION INTRAMUSCULAR; SUBCUTANEOUS
Status: SHIPPED | OUTPATIENT
Start: 2023-12-12 | End: 2024-05-10

## 2023-12-12 RX ADMIN — CYANOCOBALAMIN 1000 MCG: 1000 INJECTION, SOLUTION INTRAMUSCULAR; SUBCUTANEOUS at 13:57

## 2024-01-22 ENCOUNTER — CLINICAL SUPPORT (OUTPATIENT)
Dept: FAMILY MEDICINE CLINIC | Facility: CLINIC | Age: 39
End: 2024-01-22
Payer: COMMERCIAL

## 2024-01-22 PROCEDURE — 96372 THER/PROPH/DIAG INJ SC/IM: CPT | Performed by: PHYSICIAN ASSISTANT

## 2024-01-22 RX ADMIN — CYANOCOBALAMIN 1000 MCG: 1000 INJECTION, SOLUTION INTRAMUSCULAR; SUBCUTANEOUS at 15:15

## 2024-05-31 ENCOUNTER — CLINICAL SUPPORT (OUTPATIENT)
Dept: FAMILY MEDICINE CLINIC | Facility: CLINIC | Age: 39
End: 2024-05-31
Payer: COMMERCIAL

## 2024-05-31 DIAGNOSIS — E53.8 VITAMIN B12 DEFICIENCY: Primary | ICD-10-CM

## 2024-05-31 PROCEDURE — 96372 THER/PROPH/DIAG INJ SC/IM: CPT | Performed by: PHYSICIAN ASSISTANT

## 2024-05-31 RX ORDER — CYANOCOBALAMIN 1000 UG/ML
1000 INJECTION, SOLUTION INTRAMUSCULAR; SUBCUTANEOUS
Status: SHIPPED | OUTPATIENT
Start: 2024-05-31

## 2024-05-31 RX ADMIN — CYANOCOBALAMIN 1000 MCG: 1000 INJECTION, SOLUTION INTRAMUSCULAR; SUBCUTANEOUS at 14:13

## 2025-03-31 ENCOUNTER — APPOINTMENT (OUTPATIENT)
Dept: GENERAL RADIOLOGY | Facility: HOSPITAL | Age: 40
End: 2025-03-31
Payer: COMMERCIAL

## 2025-03-31 ENCOUNTER — OFFICE VISIT (OUTPATIENT)
Dept: FAMILY MEDICINE CLINIC | Facility: CLINIC | Age: 40
End: 2025-03-31
Payer: COMMERCIAL

## 2025-03-31 ENCOUNTER — HOSPITAL ENCOUNTER (EMERGENCY)
Facility: HOSPITAL | Age: 40
Discharge: HOME | End: 2025-03-31
Attending: EMERGENCY MEDICINE | Admitting: EMERGENCY MEDICINE
Payer: COMMERCIAL

## 2025-03-31 VITALS
HEIGHT: 65 IN | TEMPERATURE: 98.3 F | BODY MASS INDEX: 20.83 KG/M2 | WEIGHT: 125 LBS | OXYGEN SATURATION: 100 % | DIASTOLIC BLOOD PRESSURE: 100 MMHG | HEART RATE: 92 BPM | RESPIRATION RATE: 18 BRPM | SYSTOLIC BLOOD PRESSURE: 141 MMHG

## 2025-03-31 VITALS
HEART RATE: 122 BPM | OXYGEN SATURATION: 98 % | BODY MASS INDEX: 21.49 KG/M2 | WEIGHT: 129 LBS | DIASTOLIC BLOOD PRESSURE: 88 MMHG | SYSTOLIC BLOOD PRESSURE: 140 MMHG | HEIGHT: 65 IN

## 2025-03-31 DIAGNOSIS — R07.9 CHEST PAIN, UNSPECIFIED TYPE: Primary | ICD-10-CM

## 2025-03-31 DIAGNOSIS — R42 DIZZINESS: ICD-10-CM

## 2025-03-31 LAB
ALBUMIN SERPL-MCNC: 4.4 G/DL (ref 3.5–5.2)
ALBUMIN/GLOB SERPL: 1.6 G/DL
ALP SERPL-CCNC: 82 U/L (ref 39–117)
ALT SERPL W P-5'-P-CCNC: 15 U/L (ref 1–33)
ANION GAP SERPL CALCULATED.3IONS-SCNC: 13 MMOL/L (ref 5–15)
AST SERPL-CCNC: 19 U/L (ref 1–32)
BASOPHILS # BLD AUTO: 0.02 10*3/MM3 (ref 0–0.2)
BASOPHILS NFR BLD AUTO: 0.3 % (ref 0–1.5)
BILIRUB SERPL-MCNC: 1.1 MG/DL (ref 0–1.2)
BUN SERPL-MCNC: 5 MG/DL (ref 6–20)
BUN/CREAT SERPL: 8.1 (ref 7–25)
CALCIUM SPEC-SCNC: 9.1 MG/DL (ref 8.6–10.5)
CHLORIDE SERPL-SCNC: 101 MMOL/L (ref 98–107)
CO2 SERPL-SCNC: 25 MMOL/L (ref 22–29)
CREAT SERPL-MCNC: 0.62 MG/DL (ref 0.57–1)
DEPRECATED RDW RBC AUTO: 47.8 FL (ref 37–54)
EGFRCR SERPLBLD CKD-EPI 2021: 114.9 ML/MIN/1.73
EOSINOPHIL # BLD AUTO: 0.12 10*3/MM3 (ref 0–0.4)
EOSINOPHIL NFR BLD AUTO: 2 % (ref 0.3–6.2)
ERYTHROCYTE [DISTWIDTH] IN BLOOD BY AUTOMATED COUNT: 15.9 % (ref 12.3–15.4)
GEN 5 1HR TROPONIN T REFLEX: <6 NG/L
GLOBULIN UR ELPH-MCNC: 2.8 GM/DL
GLUCOSE SERPL-MCNC: 125 MG/DL (ref 65–99)
HCT VFR BLD AUTO: 35.5 % (ref 34–46.6)
HGB BLD-MCNC: 11.2 G/DL (ref 12–15.9)
HOLD SPECIMEN: NORMAL
IMM GRANULOCYTES # BLD AUTO: 0.01 10*3/MM3 (ref 0–0.05)
IMM GRANULOCYTES NFR BLD AUTO: 0.2 % (ref 0–0.5)
LYMPHOCYTES # BLD AUTO: 1.02 10*3/MM3 (ref 0.7–3.1)
LYMPHOCYTES NFR BLD AUTO: 17.1 % (ref 19.6–45.3)
MCH RBC QN AUTO: 26 PG (ref 26.6–33)
MCHC RBC AUTO-ENTMCNC: 31.5 G/DL (ref 31.5–35.7)
MCV RBC AUTO: 82.6 FL (ref 79–97)
MONOCYTES # BLD AUTO: 0.38 10*3/MM3 (ref 0.1–0.9)
MONOCYTES NFR BLD AUTO: 6.4 % (ref 5–12)
NEUTROPHILS NFR BLD AUTO: 4.4 10*3/MM3 (ref 1.7–7)
NEUTROPHILS NFR BLD AUTO: 74 % (ref 42.7–76)
NRBC BLD AUTO-RTO: 0 /100 WBC (ref 0–0.2)
PLATELET # BLD AUTO: 205 10*3/MM3 (ref 140–450)
PMV BLD AUTO: 10.1 FL (ref 6–12)
POTASSIUM SERPL-SCNC: 3.8 MMOL/L (ref 3.5–5.2)
PROT SERPL-MCNC: 7.2 G/DL (ref 6–8.5)
QT INTERVAL: 348 MS
QTC INTERVAL: 468 MS
RBC # BLD AUTO: 4.3 10*6/MM3 (ref 3.77–5.28)
SODIUM SERPL-SCNC: 139 MMOL/L (ref 136–145)
TROPONIN T NUMERIC DELTA: NORMAL
TROPONIN T SERPL HS-MCNC: <6 NG/L
WBC NRBC COR # BLD AUTO: 5.95 10*3/MM3 (ref 3.4–10.8)
WHOLE BLOOD HOLD COAG: NORMAL
WHOLE BLOOD HOLD SPECIMEN: NORMAL

## 2025-03-31 PROCEDURE — 36415 COLL VENOUS BLD VENIPUNCTURE: CPT

## 2025-03-31 PROCEDURE — 71045 X-RAY EXAM CHEST 1 VIEW: CPT

## 2025-03-31 PROCEDURE — 80053 COMPREHEN METABOLIC PANEL: CPT | Performed by: PHYSICIAN ASSISTANT

## 2025-03-31 PROCEDURE — 93005 ELECTROCARDIOGRAM TRACING: CPT | Performed by: EMERGENCY MEDICINE

## 2025-03-31 PROCEDURE — 25010000002 KETOROLAC TROMETHAMINE PER 15 MG: Performed by: PHYSICIAN ASSISTANT

## 2025-03-31 PROCEDURE — 84484 ASSAY OF TROPONIN QUANT: CPT | Performed by: PHYSICIAN ASSISTANT

## 2025-03-31 PROCEDURE — 96374 THER/PROPH/DIAG INJ IV PUSH: CPT

## 2025-03-31 PROCEDURE — 85025 COMPLETE CBC W/AUTO DIFF WBC: CPT | Performed by: PHYSICIAN ASSISTANT

## 2025-03-31 PROCEDURE — 93005 ELECTROCARDIOGRAM TRACING: CPT

## 2025-03-31 PROCEDURE — 99284 EMERGENCY DEPT VISIT MOD MDM: CPT

## 2025-03-31 RX ORDER — KETOROLAC TROMETHAMINE 30 MG/ML
15 INJECTION, SOLUTION INTRAMUSCULAR; INTRAVENOUS ONCE
Status: COMPLETED | OUTPATIENT
Start: 2025-03-31 | End: 2025-03-31

## 2025-03-31 RX ORDER — HYDROXYZINE HYDROCHLORIDE 25 MG/1
25 TABLET, FILM COATED ORAL NIGHTLY PRN
Qty: 20 TABLET | Refills: 0 | Status: SHIPPED | OUTPATIENT
Start: 2025-03-31

## 2025-03-31 RX ORDER — CLONIDINE HYDROCHLORIDE 0.1 MG/1
0.1 TABLET ORAL 2 TIMES DAILY
COMMUNITY

## 2025-03-31 RX ORDER — ASPIRIN 81 MG/1
324 TABLET, CHEWABLE ORAL ONCE
Status: DISCONTINUED | OUTPATIENT
Start: 2025-03-31 | End: 2025-03-31

## 2025-03-31 RX ORDER — SODIUM CHLORIDE 0.9 % (FLUSH) 0.9 %
10 SYRINGE (ML) INJECTION AS NEEDED
Status: DISCONTINUED | OUTPATIENT
Start: 2025-03-31 | End: 2025-03-31 | Stop reason: HOSPADM

## 2025-03-31 RX ADMIN — KETOROLAC TROMETHAMINE 15 MG: 30 INJECTION, SOLUTION INTRAMUSCULAR; INTRAVENOUS at 11:09

## 2025-03-31 NOTE — ED PROVIDER NOTES
Subjective   History of Present Illness  39-year-old female presents emergency department today with left-sided chest pain this been going on since Friday.  She reports the pain is a soreness.  She reports it does not radiate up into her neck or down into her arm.  She reports that nothing seems to exacerbate or alleviate the pain.  Occasionally palpation will increase the pain.  She has had no trauma.  She denies upper respiratory symptoms.  She does not have a history of hypertension, hyperlipidemia, diabetes.  She does have a strong family history of coronary disease.  She does not smoke.    History provided by:  Patient   used: No    Chest Pain  Pain location:  Substernal area and L chest  Pain quality: dull    Pain radiates to:  Does not radiate  Pain severity:  Moderate  Onset quality:  Gradual  Duration:  4 days  Timing:  Constant  Progression:  Unchanged  Chronicity:  New  Context: not breathing, not drug use, not eating, not lifting, not movement, not raising an arm, not at rest, not stress and not trauma    Relieved by:  Nothing  Worsened by:  Nothing  Ineffective treatments:  None tried  Associated symptoms: no abdominal pain, no AICD problem, no altered mental status, no anorexia, no anxiety, no claudication, no diaphoresis, no dysphagia, no fever, no headache, no lower extremity edema, no orthopnea, no palpitations, no shortness of breath and no vomiting    Risk factors: no aortic disease, no high cholesterol, no hypertension, not male, not obese, no prior DVT/PE, no smoking and no surgery        Review of Systems   Constitutional:  Negative for diaphoresis and fever.   HENT:  Negative for trouble swallowing.    Respiratory:  Negative for chest tightness, shortness of breath and wheezing.    Cardiovascular:  Positive for chest pain. Negative for palpitations, orthopnea and claudication.   Gastrointestinal:  Negative for abdominal pain, anorexia and vomiting.   Neurological:  Negative  for headaches.       History reviewed. No pertinent past medical history.    No Known Allergies    Past Surgical History:   Procedure Laterality Date    DIAGNOSTIC LAPAROSCOPY N/A 1/13/2022    Procedure: DIAGNOSTIC LAPAROSCOPY, LEFT SALPINGO-OOPHORECTOMY;  Surgeon: Mone Rivera MD;  Location: [facility];  Service: Obstetrics/Gynecology;  Laterality: N/A;    INTRAUTERINE DEVICE INSERTION         Family History   Problem Relation Age of Onset    Cancer Mother         breast        Social History     Socioeconomic History    Marital status:    Tobacco Use    Smoking status: Never     Passive exposure: Never    Smokeless tobacco: Never   Vaping Use    Vaping status: Never Used   Substance and Sexual Activity    Alcohol use: Not Currently     Alcohol/week: 2.0 standard drinks of alcohol     Types: 2 Glasses of wine per week     Comment: OCCASIONALLY    Drug use: Never    Sexual activity: Yes     Partners: Male     Birth control/protection: Condom           Objective   Physical Exam  Vitals and nursing note reviewed.   Constitutional:       General: She is not in acute distress.     Appearance: She is well-developed. She is not diaphoretic.   HENT:      Head: Normocephalic and atraumatic.      Nose: Nose normal.   Eyes:      General: No scleral icterus.     Conjunctiva/sclera: Conjunctivae normal.   Cardiovascular:      Rate and Rhythm: Normal rate and regular rhythm.      Heart sounds: Normal heart sounds. No murmur heard.  Pulmonary:      Effort: Pulmonary effort is normal. No respiratory distress.      Breath sounds: Normal breath sounds. No decreased breath sounds, wheezing, rhonchi or rales.   Abdominal:      General: Bowel sounds are normal.      Palpations: Abdomen is soft.      Tenderness: There is no abdominal tenderness.   Musculoskeletal:         General: Normal range of motion.      Cervical back: Normal range of motion and neck supple.   Skin:     General: Skin is warm and dry.   Neurological:       Mental Status: She is alert and oriented to person, place, and time.   Psychiatric:         Behavior: Behavior normal.         Procedures           ED Course                  HEART Score: 1   Shared Decision Making  I discussed the findings with the patient/patient representative who is in agreement with the treatment plan and the final disposition.  Risks and benefits of discharge and/or observation/admission were discussed: Yes                          Recent Results (from the past 24 hours)   ECG 12 Lead Chest Pain    Collection Time: 03/31/25 10:05 AM   Result Value Ref Range    QT Interval 348 ms    QTC Interval 468 ms   Comprehensive Metabolic Panel    Collection Time: 03/31/25 10:46 AM    Specimen: Blood   Result Value Ref Range    Glucose 125 (H) 65 - 99 mg/dL    BUN 5 (L) 6 - 20 mg/dL    Creatinine 0.62 0.57 - 1.00 mg/dL    Sodium 139 136 - 145 mmol/L    Potassium 3.8 3.5 - 5.2 mmol/L    Chloride 101 98 - 107 mmol/L    CO2 25.0 22.0 - 29.0 mmol/L    Calcium 9.1 8.6 - 10.5 mg/dL    Total Protein 7.2 6.0 - 8.5 g/dL    Albumin 4.4 3.5 - 5.2 g/dL    ALT (SGPT) 15 1 - 33 U/L    AST (SGOT) 19 1 - 32 U/L    Alkaline Phosphatase 82 39 - 117 U/L    Total Bilirubin 1.1 0.0 - 1.2 mg/dL    Globulin 2.8 gm/dL    A/G Ratio 1.6 g/dL    BUN/Creatinine Ratio 8.1 7.0 - 25.0    Anion Gap 13.0 5.0 - 15.0 mmol/L    eGFR 114.9 >60.0 mL/min/1.73   High Sensitivity Troponin T    Collection Time: 03/31/25 10:46 AM    Specimen: Blood   Result Value Ref Range    HS Troponin T <6 <14 ng/L   Green Top (Gel)    Collection Time: 03/31/25 10:46 AM   Result Value Ref Range    Extra Tube Hold for add-ons.    Lavender Top    Collection Time: 03/31/25 10:46 AM   Result Value Ref Range    Extra Tube hold for add-on    Gold Top - SST    Collection Time: 03/31/25 10:46 AM   Result Value Ref Range    Extra Tube Hold for add-ons.    Gray Top    Collection Time: 03/31/25 10:46 AM   Result Value Ref Range    Extra Tube Hold for add-ons.    Light  Blue Top    Collection Time: 03/31/25 10:46 AM   Result Value Ref Range    Extra Tube Hold for add-ons.    CBC Auto Differential    Collection Time: 03/31/25 10:46 AM    Specimen: Blood   Result Value Ref Range    WBC 5.95 3.40 - 10.80 10*3/mm3    RBC 4.30 3.77 - 5.28 10*6/mm3    Hemoglobin 11.2 (L) 12.0 - 15.9 g/dL    Hematocrit 35.5 34.0 - 46.6 %    MCV 82.6 79.0 - 97.0 fL    MCH 26.0 (L) 26.6 - 33.0 pg    MCHC 31.5 31.5 - 35.7 g/dL    RDW 15.9 (H) 12.3 - 15.4 %    RDW-SD 47.8 37.0 - 54.0 fl    MPV 10.1 6.0 - 12.0 fL    Platelets 205 140 - 450 10*3/mm3    Neutrophil % 74.0 42.7 - 76.0 %    Lymphocyte % 17.1 (L) 19.6 - 45.3 %    Monocyte % 6.4 5.0 - 12.0 %    Eosinophil % 2.0 0.3 - 6.2 %    Basophil % 0.3 0.0 - 1.5 %    Immature Grans % 0.2 0.0 - 0.5 %    Neutrophils, Absolute 4.40 1.70 - 7.00 10*3/mm3    Lymphocytes, Absolute 1.02 0.70 - 3.10 10*3/mm3    Monocytes, Absolute 0.38 0.10 - 0.90 10*3/mm3    Eosinophils, Absolute 0.12 0.00 - 0.40 10*3/mm3    Basophils, Absolute 0.02 0.00 - 0.20 10*3/mm3    Immature Grans, Absolute 0.01 0.00 - 0.05 10*3/mm3    nRBC 0.0 0.0 - 0.2 /100 WBC   High Sensitivity Troponin T 1Hr    Collection Time: 03/31/25 11:51 AM    Specimen: Arm, Left; Blood   Result Value Ref Range    HS Troponin T <6 <14 ng/L    Troponin T Numeric Delta       Note: In addition to lab results from this visit, the labs listed above may include labs taken at another facility or during a different encounter within the last 24 hours. Please correlate lab times with ED admission and discharge times for further clarification of the services performed during this visit.    XR Chest 1 View   Final Result   Impression:   No acute cardiopulmonary findings.          Electronically Signed: Albino Porras MD     3/31/2025 10:53 AM EDT     Workstation ID: [id number]        Vitals:    03/31/25 1000 03/31/25 1017 03/31/25 1130 03/31/25 1200   BP: 144/97 131/100 137/99 141/100   BP Location: Left arm      Patient Position:  "Sitting      Pulse: 113  92 92   Resp: 18      Temp: 98.3 °F (36.8 °C)      TempSrc: Oral      SpO2: 100%  100% 100%   Weight: 56.7 kg (125 lb)      Height: 165.1 cm (65\")        Medications   sodium chloride 0.9 % flush 10 mL (has no administration in time range)   ketorolac (TORADOL) injection 15 mg (15 mg Intravenous Given 3/31/25 1109)     ECG/EMG Results (last 24 hours)       Procedure Component Value Units Date/Time    ECG 12 Lead Chest Pain [[id number]] Collected: 03/31/25 1005     Updated: 03/31/25 1058     QT Interval 348 ms      QTC Interval 468 ms     Narrative:      Test Reason : Chest Pain  Blood Pressure :   */*   mmHG  Vent. Rate : 109 BPM     Atrial Rate : 109 BPM     P-R Int : 116 ms          QRS Dur :  76 ms      QT Int : 348 ms       P-R-T Axes :  61  29 -26 degrees    QTcB Int : 468 ms    Sinus tachycardia  Nonspecific ST and T wave abnormality  Abnormal ECG  No previous ECGs available    Referred By: EDMD           Confirmed By:           ECG 12 Lead Chest Pain   Preliminary Result   Test Reason : Chest Pain   Blood Pressure :   */*   mmHG   Vent. Rate : 109 BPM     Atrial Rate : 109 BPM      P-R Int : 116 ms          QRS Dur :  76 ms       QT Int : 348 ms       P-R-T Axes :  61  29 -26 degrees     QTcB Int : 468 ms      Sinus tachycardia   Nonspecific ST and T wave abnormality   Abnormal ECG   No previous ECGs available      Referred By: EDMD           Confirmed By:                         Medical Decision Making  Amount and/or Complexity of Data Reviewed  Labs: ordered. Decision-making details documented in ED Course.  Radiology: ordered. Decision-making details documented in ED Course.  ECG/medicine tests: ordered. Decision-making details documented in ED Course.    Risk  Prescription drug management.        Final diagnoses:   Chest pain, unspecified type       ED Disposition  ED Disposition       ED Disposition   Discharge    Condition   Stable    Comment   --               Maxime Moser, " LICHA  [address]  [place] [address]  [phone]      Call for appointment    [facility]  [address]  [place] [id number] [phone]             Medication List        New Prescriptions      diclofenac 50 MG EC tablet  Commonly known as: VOLTAREN  Take 1 tablet by mouth 3 (Three) Times a Day.     hydrOXYzine 25 MG tablet  Commonly known as: ATARAX  Take 1 tablet by mouth At Night As Needed for Anxiety (sleep).               Where to Get Your Medications        These medications were sent to [facility] [id number] - [place], [place] - [address] - [phone]  - [phone]  [address], [facility] [address]      Phone: [phone]   diclofenac 50 MG EC tablet  hydrOXYzine 25 MG tablet            Efrem Winn PA  03/31/25 3953

## 2025-03-31 NOTE — PROGRESS NOTES
Office Note     Name: Kimberly Erazo    : 1985     MRN: [id number]     Chief Complaint  Anxiety (More anxiety and stress here lately. But has been to [facility] a couple times and was put on medication. Last 2 days having some issues with sleep, and if she doesn't sleep she gets more anxious. )    Subjective     History of Present Illness:  Kimberly Erazo is a 39 y.o. female who presents today for follow up of [facility] visit.     Patient was evaluated at [facility] on 3/29/25 with chest pain and tachycardia.  Patient completed an EKG which revealed sinus tachycardia and ST segment elevation in V1 and borderline ST depression in leads II and III.  She was advised to seek emergency care based on symptoms and abnormal EKG.  Patient states that she did not proceed to ER, instead sought care at a different urgent treatment center which provided her a prescription of clonidine.  Since Saturday, she has been taking clonidine as needed, ashwagandha, melatonin.  Patient states that she is still struggling with chest pain and trouble sleeping.  She reports small improvement with clonidine.  She comes in today asking for further treatment for anxiety and trouble sleeping.  She states that she has had chest pain similar to this before, and was prescribed muscle relaxers which helped.  She also endorses dizziness.  She denies shortness of breath.   FREIDA-7 = 5    Review of Systems:   Review of Systems   Constitutional:  Negative for chills, fatigue and fever.   HENT:  Negative for congestion.    Respiratory:  Negative for cough, shortness of breath and wheezing.    Cardiovascular:  Positive for chest pain and palpitations.   Gastrointestinal:  Negative for abdominal pain.   Musculoskeletal:  Positive for myalgias.   Neurological:  Positive for dizziness and light-headedness. Negative for headaches.        Past Medical History: History reviewed. No pertinent past medical history.    Past Surgical History:   Past  "Surgical History:   Procedure Laterality Date    DIAGNOSTIC LAPAROSCOPY N/A 1/13/2022    Procedure: DIAGNOSTIC LAPAROSCOPY, LEFT SALPINGO-OOPHORECTOMY;  Surgeon: Mone Rivera MD;  Location: [facility];  Service: Obstetrics/Gynecology;  Laterality: N/A;    INTRAUTERINE DEVICE INSERTION         Family History:   Family History   Problem Relation Age of Onset    Cancer Mother         breast        Social History:   Social History     Socioeconomic History    Marital status:    Tobacco Use    Smoking status: Never     Passive exposure: Never    Smokeless tobacco: Never   Vaping Use    Vaping status: Never Used   Substance and Sexual Activity    Alcohol use: Not Currently     Alcohol/week: 2.0 standard drinks of alcohol     Types: 2 Glasses of wine per week     Comment: OCCASIONALLY    Drug use: Never    Sexual activity: Yes     Partners: Male     Birth control/protection: Condom       Immunizations:   Immunization History   Administered Date(s) Administered    COVID-19 (MODERNA) 1st,2nd,3rd Dose Monovalent 05/22/2021, 06/19/2021        Medications:     Current Outpatient Medications:     ASHWAGANDHA GUMMIES PO, Take  by mouth., Disp: , Rfl:     cloNIDine (CATAPRES) 0.1 MG tablet, Take 1 tablet by mouth 2 (Two) Times a Day., Disp: , Rfl:     Paragard Intrauterine Copper intrauterine device IUD, as directed Intrauterine, Disp: , Rfl:   No current facility-administered medications for this visit.    Allergies:   No Known Allergies    Objective     Vital Signs  /88   Pulse (!) 122   Ht 165.1 cm (65\")   Wt 58.5 kg (129 lb)   SpO2 98%   BMI 21.47 kg/m²   Estimated body mass index is 21.47 kg/m² as calculated from the following:    Height as of this encounter: 165.1 cm (65\").    Weight as of this encounter: 58.5 kg (129 lb).    BMI is within normal parameters. No other follow-up for BMI required.       Physical Exam  Vitals reviewed.   Constitutional:       General: She is not in acute distress.     " Appearance: Normal appearance. She is not ill-appearing.   HENT:      Head: Normocephalic and atraumatic.      Right Ear: Tympanic membrane normal.      Mouth/Throat:      Mouth: Mucous membranes are moist.      Pharynx: No oropharyngeal exudate or posterior oropharyngeal erythema.   Eyes:      Pupils: Pupils are equal, round, and reactive to light.   Cardiovascular:      Rate and Rhythm: Regular rhythm. Tachycardia present.      Pulses: Normal pulses.      Heart sounds: Normal heart sounds. No murmur heard.  Pulmonary:      Effort: Pulmonary effort is normal. No respiratory distress.      Breath sounds: Normal breath sounds. No wheezing or rales.   Lymphadenopathy:      Cervical: No cervical adenopathy.   Skin:     Capillary Refill: Capillary refill takes less than 2 seconds.   Neurological:      Mental Status: She is alert.   Psychiatric:         Mood and Affect: Mood is anxious.         Behavior: Behavior is hyperactive.          Procedures     Results:  No results found for this or any previous visit (from the past 24 hours).     Assessment and Plan     Assessment/Plan:  Assessment & Plan  Chest pain, unspecified type  Dizziness  Patient continues to have elevated heart rate and blood pressure despite clonidine treatment.  While anxiety seems like a reasonable explanation for symptoms and presentation, I am not convinced that this is an explanation for her current presentation.  Patient scored low on FREIDA-7 screening and states that symptoms were not present before Saturday.  Due to continuing tachycardia, chest pain, dizziness, acute onset of symptoms, and abnormal EKG findings, I strongly advised patient to seek emergency care to rule out cardiac arrhythmia, pulmonary embolism, cardiac ischemia.             Follow Up  Return if symptoms worsen or fail to improve.        Zenaida Felton PA-C   [facility]

## 2025-04-01 ENCOUNTER — HOSPITAL ENCOUNTER (EMERGENCY)
Facility: HOSPITAL | Age: 40
Discharge: HOME | End: 2025-04-01
Attending: EMERGENCY MEDICINE | Admitting: EMERGENCY MEDICINE
Payer: COMMERCIAL

## 2025-04-01 VITALS
BODY MASS INDEX: 19.29 KG/M2 | OXYGEN SATURATION: 100 % | HEIGHT: 66 IN | WEIGHT: 120 LBS | RESPIRATION RATE: 18 BRPM | SYSTOLIC BLOOD PRESSURE: 169 MMHG | HEART RATE: 100 BPM | DIASTOLIC BLOOD PRESSURE: 106 MMHG | TEMPERATURE: 98 F

## 2025-04-01 DIAGNOSIS — K21.9 GASTROESOPHAGEAL REFLUX DISEASE, UNSPECIFIED WHETHER ESOPHAGITIS PRESENT: ICD-10-CM

## 2025-04-01 DIAGNOSIS — R45.89 ANXIETY ABOUT HEALTH: Primary | ICD-10-CM

## 2025-04-01 LAB — TROPONIN T SERPL HS-MCNC: <6 NG/L

## 2025-04-01 PROCEDURE — 99283 EMERGENCY DEPT VISIT LOW MDM: CPT

## 2025-04-01 PROCEDURE — 93005 ELECTROCARDIOGRAM TRACING: CPT | Performed by: EMERGENCY MEDICINE

## 2025-04-01 PROCEDURE — 36415 COLL VENOUS BLD VENIPUNCTURE: CPT

## 2025-04-01 PROCEDURE — 84484 ASSAY OF TROPONIN QUANT: CPT | Performed by: PHYSICIAN ASSISTANT

## 2025-04-01 RX ORDER — ALUMINA, MAGNESIA, AND SIMETHICONE 2400; 2400; 240 MG/30ML; MG/30ML; MG/30ML
15 SUSPENSION ORAL ONCE
Status: COMPLETED | OUTPATIENT
Start: 2025-04-01 | End: 2025-04-01

## 2025-04-01 RX ORDER — LIDOCAINE HYDROCHLORIDE 20 MG/ML
10 SOLUTION OROPHARYNGEAL ONCE
Status: COMPLETED | OUTPATIENT
Start: 2025-04-01 | End: 2025-04-01

## 2025-04-01 RX ORDER — SODIUM CHLORIDE 0.9 % (FLUSH) 0.9 %
10 SYRINGE (ML) INJECTION AS NEEDED
Status: DISCONTINUED | OUTPATIENT
Start: 2025-04-01 | End: 2025-04-01 | Stop reason: HOSPADM

## 2025-04-01 RX ORDER — ASPIRIN 81 MG/1
324 TABLET, CHEWABLE ORAL ONCE
Status: DISCONTINUED | OUTPATIENT
Start: 2025-04-01 | End: 2025-04-01

## 2025-04-01 RX ADMIN — LIDOCAINE HYDROCHLORIDE 10 ML: 20 SOLUTION ORAL at 02:17

## 2025-04-01 RX ADMIN — ALUMINUM HYDROXIDE, MAGNESIUM HYDROXIDE, AND DIMETHICONE 15 ML: 400; 400; 40 SUSPENSION ORAL at 02:18

## 2025-04-01 NOTE — ED PROVIDER NOTES
" EMERGENCY DEPARTMENT ENCOUNTER    Pt Name: Kimberly Erazo  MRN: [id number]  Pt :   1985  Room Number:    Date of encounter:  2025  PCP: Maxime Moser PA-C  ED Provider: MARIANA Neal    Historian: Patient    HPI:  Chief Complaint: Chest pain    Context: Kimberly Erazo is a 39 y.o. female who presents to the ED c/o chest pain. Patient presented earlier in the day today with left-sided chest pain that had been going on since Friday. She reported that the pain was a \"soreness\". She shared that  it did not radiate up into her neck or down into her arm. She denied anything that seems to exacerbate or alleviate the pain. Occasionally palpation will increase the pain. She has had no trauma. She denied upper respiratory symptoms. She does not have a history of hypertension, hyperlipidemia, diabetes. She does have a strong family history of coronary disease. She does not smoke. She was discharged from the ED following a complete negative cardiac workup. She began to experience the pain again this evening and after searching on [employer] feels as if there may be something related to gas that she is concerned could travel throughout her body and into her brain.    HPI     REVIEW OF SYSTEMS  A chief complaint appropriate review of systems was completed and is negative except as noted in the HPI.     PAST MEDICAL HISTORY  No past medical history on file.    PAST SURGICAL HISTORY  Past Surgical History:   Procedure Laterality Date    DIAGNOSTIC LAPAROSCOPY N/A 2022    Procedure: DIAGNOSTIC LAPAROSCOPY, LEFT SALPINGO-OOPHORECTOMY;  Surgeon: Mone Rivera MD;  Location: [facility];  Service: Obstetrics/Gynecology;  Laterality: N/A;    INTRAUTERINE DEVICE INSERTION         FAMILY HISTORY  Family History   Problem Relation Age of Onset    Cancer Mother         breast        SOCIAL HISTORY  Social History     Socioeconomic History    Marital status:    Tobacco Use    Smoking status: Never     " Passive exposure: Never    Smokeless tobacco: Never   Vaping Use    Vaping status: Never Used   Substance and Sexual Activity    Alcohol use: Not Currently     Alcohol/week: 2.0 standard drinks of alcohol     Types: 2 Glasses of wine per week     Comment: OCCASIONALLY    Drug use: Never    Sexual activity: Yes     Partners: Male     Birth control/protection: Condom       ALLERGIES  Patient has no known allergies.    PHYSICAL EXAM  Physical Exam  GENERAL:   Appears in no acute distress. Non-toxic in appearance  HENT: Nares patent.  EYES: No scleral icterus.  CV: Regular rhythm, regular rate.  RESPIRATORY: Normal effort.  No audible wheezes, rales or rhonchi.  ABDOMEN: Soft, nontender  MUSCULOSKELETAL: No deformities.   NEURO: Alert, moves all extremities, follows commands.  SKIN: Warm, dry, no rash visualized.  PSYCH: Anxious  I have reviewed the triage vital signs and nursing notes.     LAB RESULTS  Results for orders placed or performed during the hospital encounter of 04/01/25   High Sensitivity Troponin T    Collection Time: 04/01/25  2:04 AM    Specimen: Blood   Result Value Ref Range    HS Troponin T <6 <14 ng/L       If labs were ordered, I independently reviewed the results and considered them in treating the patient.    RADIOLOGY  No orders to display     [] Radiologist's Report Reviewed:  I ordered and independently interpreted the above noted radiographic studies.  See radiologist's dictation for official interpretation.      PROCEDURES    Procedures    No orders to display       MEDICATIONS GIVEN IN ER    Medications   aluminum-magnesium hydroxide-simethicone (MAALOX MAX) 400-400-40 MG/5ML suspension 15 mL (15 mL Oral Given 4/1/25 0218)   Lidocaine Viscous HCl (XYLOCAINE) 2 % solution 10 mL (10 mL Mouth/Throat Given 4/1/25 0217)       MEDICAL DECISION MAKING, PROGRESS, and CONSULTS   Medical Decision Making  39-year-old female with a history of chest pain presented to the emergency department earlier  today and underwent a comprehensive cardiac workup, including an EKG and high-sensitivity troponin, both of which were negative. She was discharged home with instructions for symptomatic care and outpatient follow-up. After returning home, the patient continued to experience chest discomfort and researched potential causes online, which led to heightened concern due to the variety of possible diagnoses she encountered. She subsequently returned to the emergency department for re-evaluation. A repeat EKG showed no evidence of acute ischemic changes, and a repeat high-sensitivity troponin remained negative. The patient was treated with a GI cocktail, resulting in symptomatic improvement. Her symptoms were deemed consistent with a gastrointestinal etiology, likely exacerbated by anxiety related to her health concerns. She was counseled on continued symptomatic management and instructed to follow up with her primary care provider as an outpatient. Return precautions were provided, and the patient was discharged in stable condition.    Problems Addressed:  Anxiety about health: complicated acute illness or injury  Gastroesophageal reflux disease, unspecified whether esophagitis present: complicated acute illness or injury    Amount and/or Complexity of Data Reviewed  Labs: ordered. Decision-making details documented in ED Course.  ECG/medicine tests: ordered and independent interpretation performed. Decision-making details documented in ED Course.    Risk  OTC drugs.  Prescription drug management.    Discussion below represents my analysis of pertinent findings related to patient's condition, differential diagnosis, treatment plan and final disposition.    Differential diagnosis: Congestive heart failure (volume overload), acute coronary syndrome (STEMI/NSTEMI), pulmonary embolism, pneumothorax, rib contusions and fractures, intercostal muscle strains, costochondritis, pneumonia, URI, myocarditis, anxiety and GERD.      Additional sources  Discussed/ obtained information from independent historians:   [x] Spouse  [] Parent  [] Family member  [] Friend  [] EMS   [] Other:  External (non-ED) record review:   [] Inpatient record:   [] Office record:   [] Outpatient record:   [] Prior Outpatient labs:   [] Prior Outpatient radiology:   [] Primary Care record:   [] Outside ED record:   [] Other:   Patient's care impacted by:   [x] Pre-Diabetes  [] Hypertension  [x] Hyperlipidemia  [] Hypothyroidism   [] Coronary Artery Disease  [] Congestive Heart Failure   [] COPD   [] Cancer   [] Obesity  [] GERD   [] Tobacco Abuse   [] Substance Abuse    [x] Anxiety   [] Depression   [] Other:   Care significantly affected by Social Determinants of Health (housing and economic circumstances, unemployment)    [] Yes     [x] No   If yes, Patient's care significantly limited by  Social Determinants of Health including:   [] Inadequate housing   [] Low income   [] Alcoholism and drug addiction in family   [] Problems related to primary support group   [] Unemployment   [] Problems related to employment   [] Other Social Determinants of Health:     Orders placed during this visit:  Orders Placed This Encounter   Procedures    High Sensitivity Troponin T    Continuous Pulse Oximetry     ED Course:    ED Course as of 04/01/25 1344   Tue Apr 01, 2025   0155 Vitals and Telemetry tracing was reviewed and directly interpreted by myself demonstrating blood pressure 169/106, temperature 98 °F, heart rate of 100, respirations 18 breaths/min and oxygen saturation 100% on room air [JG]   0155 BP(!): 169/106 [JG]   0155 Temp: 98 °F (36.7 °C) [JG]   0155 Heart Rate: 100 [JG]   0155 Resp: 18 [JG]   0155 SpO2: 100 % [JG]   0211 EKG personally interpreted by myself in addition to interpretation by attending without evidence of acute ischemic changes; normal sinus rhythm [JG]   0245 Repeat HS Troponin  reviewed and directly interpreted by myself and demonstrated  findings within normal limits.  [JG]      ED Course User Index  [JG] Josh Chen PA            DIAGNOSIS  Final diagnoses:   Gastroesophageal reflux disease, unspecified whether esophagitis present   Anxiety about health     DISPOSITION    ED Disposition       ED Disposition   Discharge    Condition   Stable    Comment   --           DISCHARGE    Patient discharged in stable condition.    Reviewed implications of results, diagnosis, meds, responsibility to follow up, warning signs and symptoms of possible worsening, potential complications and reasons to return to ER.    Patient/Family voiced understanding of above instructions.    Discussed plan for discharge, as there is no emergent indication for admission.  Pt/family is agreeable and understands need for follow up and possible repeat testing.  Pt/family is aware that discharge does not mean that nothing is wrong but that it indicates no emergency is currently present that requires admission and they must continue care with follow-up as given below or with a physician of their choice.     FOLLOW-UP  Maxime Moser PA-C  [address] [id number] [phone]    Call   Call for follow up with primary care         Medication List      No changes were made to your prescriptions during this visit.          Please note that portions of this document were completed with voice recognition software.        Josh Chen PA  04/01/25 0465

## 2025-04-07 LAB
QT INTERVAL: 386 MS
QTC INTERVAL: 469 MS

## 2025-04-08 ENCOUNTER — TELEPHONE (OUTPATIENT)
Dept: CARDIOLOGY | Facility: HOSPITAL | Age: 40
End: 2025-04-08
Payer: COMMERCIAL

## 2025-04-08 NOTE — TELEPHONE ENCOUNTER
Patient was referred to the [facility] by the [facility]. Several attempts have been made to contact the patient with no success. Letter was mailed to patient to contact the office to schedule.

## 2025-04-09 LAB
QT INTERVAL: 348 MS
QTC INTERVAL: 468 MS

## 2025-05-09 ENCOUNTER — TELEPHONE (OUTPATIENT)
Dept: FAMILY MEDICINE CLINIC | Facility: CLINIC | Age: 40
End: 2025-05-09

## 2025-05-09 ENCOUNTER — OFFICE VISIT (OUTPATIENT)
Dept: FAMILY MEDICINE CLINIC | Facility: CLINIC | Age: 40
End: 2025-05-09
Payer: COMMERCIAL

## 2025-05-09 VITALS
DIASTOLIC BLOOD PRESSURE: 82 MMHG | SYSTOLIC BLOOD PRESSURE: 132 MMHG | HEIGHT: 66 IN | OXYGEN SATURATION: 98 % | WEIGHT: 123.8 LBS | BODY MASS INDEX: 19.89 KG/M2 | HEART RATE: 101 BPM

## 2025-05-09 DIAGNOSIS — F43.22 ADJUSTMENT DISORDER WITH ANXIOUS MOOD: Primary | ICD-10-CM

## 2025-05-09 DIAGNOSIS — Z00.00 HEALTHCARE MAINTENANCE: Primary | ICD-10-CM

## 2025-05-09 DIAGNOSIS — G47.9 SLEEP DISTURBANCE: ICD-10-CM

## 2025-05-09 PROCEDURE — 99214 OFFICE O/P EST MOD 30 MIN: CPT | Performed by: PHYSICIAN ASSISTANT

## 2025-05-09 RX ORDER — PROPRANOLOL HYDROCHLORIDE 10 MG/1
10 TABLET ORAL 2 TIMES DAILY PRN
Qty: 60 TABLET | Refills: 1 | Status: SHIPPED | OUTPATIENT
Start: 2025-05-09

## 2025-05-09 RX ORDER — HYDROXYZINE HYDROCHLORIDE 25 MG/1
50 TABLET, FILM COATED ORAL NIGHTLY PRN
Qty: 60 TABLET | Refills: 1 | Status: SHIPPED | OUTPATIENT
Start: 2025-05-09

## 2025-05-09 NOTE — TELEPHONE ENCOUNTER
We would need to have labs updated to determine this. I entered orders so that she can stop by the lab.

## 2025-05-09 NOTE — PROGRESS NOTES
"    Chief Complaint   Patient presents with    Anxiety     Anxiety concerns (month)   Trouble sleeping (month)  Restless and trouble focusing        HPI     Kimberly Erazo is a pleasant 39 y.o. female who presents for evaluation of \"chief complaint.\"     Patient is accompanied by her family member today. She c/o worsening anxiety and difficulty sleeping for 1 month. She has had anxiety issues in the past but not to the extent of her current symptoms. She thought something had happened to her family member after his car was broken down, and she did not hear from him. Has been to the ER for palpitations. They prescribed hydroxyzine bit this only helped her sleep for 1-2 hours.     History reviewed. No pertinent past medical history.    Past Surgical History:   Procedure Laterality Date    DIAGNOSTIC LAPAROSCOPY N/A 1/13/2022    Procedure: DIAGNOSTIC LAPAROSCOPY, LEFT SALPINGO-OOPHORECTOMY;  Surgeon: Mone Rivera MD;  Location: [facility];  Service: Obstetrics/Gynecology;  Laterality: N/A;    INTRAUTERINE DEVICE INSERTION         Family History   Problem Relation Age of Onset    Cancer Mother         breast        Social History     Socioeconomic History    Marital status:    Tobacco Use    Smoking status: Never     Passive exposure: Never    Smokeless tobacco: Never   Vaping Use    Vaping status: Never Used   Substance and Sexual Activity    Alcohol use: Not Currently     Alcohol/week: 2.0 standard drinks of alcohol     Types: 2 Glasses of wine per week     Comment: OCCASIONALLY    Drug use: Never    Sexual activity: Yes     Partners: Male     Birth control/protection: Condom       No Known Allergies    ROS    Review of Systems   Psychiatric/Behavioral:  Positive for sleep disturbance and stress. Negative for suicidal ideas and depressed mood. The patient is nervous/anxious.        Vitals:    05/09/25 1023   BP: 132/82   Pulse: 101   SpO2: 98%     Body mass index is 19.99 kg/m².      Current Outpatient " Medications:     ASHWAGANDHA GUMMIES PO, Take  by mouth., Disp: , Rfl:     cloNIDine (CATAPRES) 0.1 MG tablet, Take 1 tablet by mouth 2 (Two) Times a Day., Disp: , Rfl:     hydrOXYzine (ATARAX) 25 MG tablet, Take 2 tablets by mouth At Night As Needed for Anxiety (sleep)., Disp: 60 tablet, Rfl: 1    Paragard Intrauterine Copper intrauterine device IUD, as directed Intrauterine, Disp: , Rfl:     diclofenac (VOLTAREN) 50 MG EC tablet, Take 1 tablet by mouth 3 (Three) Times a Day. (Patient not taking: Reported on 5/9/2025), Disp: 15 tablet, Rfl: 0    propranolol (INDERAL) 10 MG tablet, Take 1 tablet by mouth 2 (Two) Times a Day As Needed (anxiety)., Disp: 60 tablet, Rfl: 1    sertraline (Zoloft) 50 MG tablet, Take 1/2 tablet once daily for 7d, then 1 tablet qd, Disp: 30 tablet, Rfl: 2    PE    Physical Exam  Vitals reviewed.   Constitutional:       General: She is not in acute distress.  Pulmonary:      Effort: Pulmonary effort is normal. No respiratory distress.   Neurological:      Mental Status: She is alert.   Psychiatric:         Mood and Affect: Mood is anxious.      Comments: Pacing room. Appears very anxious.           A/P    Problem List Items Addressed This Visit    None  Visit Diagnoses         Adjustment disorder with anxious mood    -  Primary    Relevant Medications    sertraline (Zoloft) 50 MG tablet    hydrOXYzine (ATARAX) 25 MG tablet    Other Relevant Orders    Ambulatory Referral to Behavioral Health      Sleep disturbance        Relevant Orders    Ambulatory Referral to Behavioral Health          -Long-term history of mild anxiety worsening in the past month. The patient is interested in a trial of Zoloft and increasing hydroxyzine to 50 mg nightly for sleep disturbance. We will also trial propranolol twice daily as needed during the day. Refer to behavioral health for talk therapy.  -Return to clinic in 5 weeks for recheck, sooner as needed.    Plan of care was reviewed with patient at the  conclusion of today's visit. Education was provided regarding diagnoses, management, prescribed or recommended OTC products, and the importance of compliance with follow-up appointments. The patient was counseled regarding the risks, benefits, and possible side-effects of treatment. I advised the patient to keep me informed of any acute changes in their status including new, worsening, or persistent symptoms. Patient expresses understanding and agreement with the management plan.        Maxime Moser PA-C

## 2025-05-09 NOTE — TELEPHONE ENCOUNTER
Caller: Kimberly Erazo    Relationship: Self    Best call back number: [phone]     What is the best time to reach you: ANY    Who are you requesting to speak with (clinical staff, provider,  specific staff member): PCP    Do you know the name of the person who called: SELF    What was the call regarding: PATIENT WANTS TO KNOW IF SHE SHOULD RECEIVE A B12 SHOT OR ANY OTHER MEDICATIONS. PLEASE CALL AND DISCUSS. [phone]

## 2025-05-09 NOTE — TELEPHONE ENCOUNTER
Left message for Kimberly Erazo  to return my call.    Hub may relay message and document    Maxime Moser PA-C      5/9/25  1:06 PM  Note      We would need to have labs updated to determine this. I entered orders so that she can stop by the lab.

## (undated) DEVICE — APPL CHLORAPREP 26ML CLR

## (undated) DEVICE — ENDOPATH XCEL BLADELESS TROCARS WITH STABILITY SLEEVES: Brand: ENDOPATH XCEL

## (undated) DEVICE — SUT MNCRYL 4/0 PS2 18 IN

## (undated) DEVICE — ADHS SKIN PREMIERPRO EXOFIN TOPICAL HI/VISC .5ML

## (undated) DEVICE — HARMONIC ACE +7 LAPAROSCOPIC SHEARS ADVANCED HEMOSTASIS 5MM DIAMETER 36CM SHAFT LENGTH  FOR USE WITH GRAY HAND PIECE ONLY: Brand: HARMONIC ACE

## (undated) DEVICE — ENDOPOUCH RETRIEVER SPECIMEN RETRIEVAL BAGS: Brand: ENDOPOUCH RETRIEVER

## (undated) DEVICE — SCRB SURG BACTOSHIELD CHG 4PCT 4OZ

## (undated) DEVICE — CVR HNDL LIGHT RIGID

## (undated) DEVICE — SYR TB SFTY 1CC W 27G 1/2IN NDL

## (undated) DEVICE — GLV SURG SENSICARE PI MIC PF SZ7 LF STRL

## (undated) DEVICE — ENDOPATH XCEL UNIVERSAL TROCAR STABLILITY SLEEVES: Brand: ENDOPATH XCEL

## (undated) DEVICE — [HIGH FLOW INSUFFLATOR,  DO NOT USE IF PACKAGE IS DAMAGED,  KEEP DRY,  KEEP AWAY FROM SUNLIGHT,  PROTECT FROM HEAT AND RADIOACTIVE SOURCES.]: Brand: PNEUMOSURE

## (undated) DEVICE — TRENDELENBURG WINGPAD POSITIONING KIT DELUXE - WITHOUT BODY STRAP: Brand: SOULE MEDICAL

## (undated) DEVICE — GLV SURG SENSICARE PI MIC PF SZ6.5 LF STRL

## (undated) DEVICE — MANIP UTER KRONNER

## (undated) DEVICE — LEX GYN MINOR LAPAROSCOPY: Brand: MEDLINE INDUSTRIES, INC.